# Patient Record
Sex: FEMALE | Race: WHITE | NOT HISPANIC OR LATINO | Employment: OTHER | URBAN - METROPOLITAN AREA
[De-identification: names, ages, dates, MRNs, and addresses within clinical notes are randomized per-mention and may not be internally consistent; named-entity substitution may affect disease eponyms.]

---

## 2017-01-01 ENCOUNTER — APPOINTMENT (OUTPATIENT)
Dept: LAB | Facility: HOSPITAL | Age: 82
End: 2017-01-01
Attending: INTERNAL MEDICINE
Payer: MEDICARE

## 2017-01-01 ENCOUNTER — HOSPITAL ENCOUNTER (OUTPATIENT)
Dept: INFUSION CENTER | Facility: HOSPITAL | Age: 82
Discharge: HOME | End: 2017-01-17
Payer: MEDICARE

## 2017-01-01 ENCOUNTER — TRANSCRIBE ORDERS (OUTPATIENT)
Dept: LAB | Facility: HOSPITAL | Age: 82
End: 2017-01-01

## 2017-01-01 ENCOUNTER — ALLSCRIPTS OFFICE VISIT (OUTPATIENT)
Dept: WOUND CARE | Facility: HOSPITAL | Age: 82
End: 2017-01-01
Attending: PREVENTIVE MEDICINE
Payer: MEDICARE

## 2017-01-01 ENCOUNTER — ALLSCRIPTS OFFICE VISIT (OUTPATIENT)
Dept: OTHER | Facility: OTHER | Age: 82
End: 2017-01-01

## 2017-01-01 ENCOUNTER — HOSPITAL ENCOUNTER (OUTPATIENT)
Dept: INFUSION CENTER | Facility: HOSPITAL | Age: 82
Discharge: HOME | End: 2017-03-13
Payer: MEDICARE

## 2017-01-01 ENCOUNTER — APPOINTMENT (EMERGENCY)
Dept: RADIOLOGY | Facility: HOSPITAL | Age: 82
DRG: 808 | End: 2017-01-01
Payer: MEDICARE

## 2017-01-01 ENCOUNTER — GENERIC CONVERSION - ENCOUNTER (OUTPATIENT)
Dept: OTHER | Facility: OTHER | Age: 82
End: 2017-01-01

## 2017-01-01 ENCOUNTER — HOSPITAL ENCOUNTER (OUTPATIENT)
Dept: INFUSION CENTER | Facility: HOSPITAL | Age: 82
Discharge: HOME | End: 2017-02-03
Payer: MEDICARE

## 2017-01-01 ENCOUNTER — HOSPITAL ENCOUNTER (OUTPATIENT)
Dept: INFUSION CENTER | Facility: HOSPITAL | Age: 82
Discharge: HOME | End: 2017-04-12
Payer: MEDICARE

## 2017-01-01 ENCOUNTER — HOSPITAL ENCOUNTER (OUTPATIENT)
Dept: INFUSION CENTER | Facility: HOSPITAL | Age: 82
Discharge: HOME | End: 2017-01-19
Payer: MEDICARE

## 2017-01-01 ENCOUNTER — HOSPITAL ENCOUNTER (OUTPATIENT)
Dept: INFUSION CENTER | Facility: HOSPITAL | Age: 82
Discharge: HOME | End: 2017-03-02
Payer: MEDICARE

## 2017-01-01 ENCOUNTER — HOSPITAL ENCOUNTER (OUTPATIENT)
Dept: INFUSION CENTER | Facility: HOSPITAL | Age: 82
Discharge: HOME | End: 2017-04-08
Payer: MEDICARE

## 2017-01-01 ENCOUNTER — HOSPITAL ENCOUNTER (OUTPATIENT)
Dept: INFUSION CENTER | Facility: HOSPITAL | Age: 82
Discharge: HOME | DRG: 808 | End: 2017-04-24
Payer: MEDICARE

## 2017-01-01 ENCOUNTER — HOSPITAL ENCOUNTER (OUTPATIENT)
Dept: INFUSION CENTER | Facility: HOSPITAL | Age: 82
Discharge: HOME | End: 2017-01-18
Payer: MEDICARE

## 2017-01-01 ENCOUNTER — HOSPITAL ENCOUNTER (OUTPATIENT)
Dept: INFUSION CENTER | Facility: HOSPITAL | Age: 82
Discharge: HOME | End: 2017-04-11
Payer: MEDICARE

## 2017-01-01 ENCOUNTER — HOSPITAL ENCOUNTER (OUTPATIENT)
Dept: INFUSION CENTER | Facility: HOSPITAL | Age: 82
Discharge: HOME | End: 2017-01-03
Payer: MEDICARE

## 2017-01-01 ENCOUNTER — HOSPITAL ENCOUNTER (OUTPATIENT)
Dept: INFUSION CENTER | Facility: HOSPITAL | Age: 82
Discharge: HOME | End: 2017-04-17
Payer: MEDICARE

## 2017-01-01 ENCOUNTER — HOSPITAL ENCOUNTER (OUTPATIENT)
Dept: INFUSION CENTER | Facility: HOSPITAL | Age: 82
Discharge: HOME | End: 2017-01-20
Payer: MEDICARE

## 2017-01-01 ENCOUNTER — HOSPITAL ENCOUNTER (OUTPATIENT)
Dept: INFUSION CENTER | Facility: HOSPITAL | Age: 82
End: 2017-01-01
Payer: MEDICARE

## 2017-01-01 ENCOUNTER — HOSPITAL ENCOUNTER (OUTPATIENT)
Dept: INFUSION CENTER | Facility: HOSPITAL | Age: 82
Discharge: HOME | End: 2017-01-30
Payer: MEDICARE

## 2017-01-01 ENCOUNTER — HOSPITAL ENCOUNTER (OUTPATIENT)
Dept: INFUSION CENTER | Facility: HOSPITAL | Age: 82
Discharge: HOME | End: 2017-01-23
Payer: MEDICARE

## 2017-01-01 ENCOUNTER — HOSPITAL ENCOUNTER (OUTPATIENT)
Dept: INFUSION CENTER | Facility: HOSPITAL | Age: 82
Discharge: HOME | End: 2017-04-26
Payer: MEDICARE

## 2017-01-01 ENCOUNTER — OFFICE VISIT (OUTPATIENT)
Dept: URGENT CARE | Age: 82
End: 2017-01-01
Payer: MEDICARE

## 2017-01-01 ENCOUNTER — HOSPITAL ENCOUNTER (INPATIENT)
Facility: HOSPITAL | Age: 82
LOS: 14 days | Discharge: HOSPICE | DRG: 808 | End: 2017-05-09
Attending: EMERGENCY MEDICINE | Admitting: INTERNAL MEDICINE
Payer: MEDICARE

## 2017-01-01 ENCOUNTER — HOSPITAL ENCOUNTER (OUTPATIENT)
Dept: INFUSION CENTER | Facility: HOSPITAL | Age: 82
Discharge: HOME | End: 2017-02-27
Payer: MEDICARE

## 2017-01-01 ENCOUNTER — HOSPITAL ENCOUNTER (OUTPATIENT)
Dept: INFUSION CENTER | Facility: HOSPITAL | Age: 82
Discharge: HOME | End: 2017-03-16
Payer: MEDICARE

## 2017-01-01 ENCOUNTER — HOSPITAL ENCOUNTER (OUTPATIENT)
Dept: INFUSION CENTER | Facility: HOSPITAL | Age: 82
Discharge: HOME | End: 2017-02-28
Payer: MEDICARE

## 2017-01-01 ENCOUNTER — HOSPITAL ENCOUNTER (OUTPATIENT)
Dept: INFUSION CENTER | Facility: HOSPITAL | Age: 82
Discharge: HOME | End: 2017-03-17
Payer: MEDICARE

## 2017-01-01 ENCOUNTER — HOSPITAL ENCOUNTER (OUTPATIENT)
Dept: INFUSION CENTER | Facility: HOSPITAL | Age: 82
Discharge: HOME | End: 2017-04-18
Payer: MEDICARE

## 2017-01-01 ENCOUNTER — HOSPITAL ENCOUNTER (OUTPATIENT)
Dept: INFUSION CENTER | Facility: HOSPITAL | Age: 82
Discharge: HOME | End: 2017-02-20
Payer: MEDICARE

## 2017-01-01 ENCOUNTER — HOSPITAL ENCOUNTER (OUTPATIENT)
Dept: INFUSION CENTER | Facility: HOSPITAL | Age: 82
Discharge: HOME | End: 2017-04-13
Payer: MEDICARE

## 2017-01-01 ENCOUNTER — HOSPITAL ENCOUNTER (OUTPATIENT)
Dept: INFUSION CENTER | Facility: HOSPITAL | Age: 82
Discharge: HOME | End: 2017-03-20
Payer: MEDICARE

## 2017-01-01 ENCOUNTER — HOSPITAL ENCOUNTER (OUTPATIENT)
Dept: INFUSION CENTER | Facility: HOSPITAL | Age: 82
Discharge: HOME | End: 2017-03-01
Payer: MEDICARE

## 2017-01-01 ENCOUNTER — HOSPITAL ENCOUNTER (OUTPATIENT)
Dept: INFUSION CENTER | Facility: HOSPITAL | Age: 82
Discharge: HOME | End: 2017-02-13
Payer: MEDICARE

## 2017-01-01 ENCOUNTER — HOSPITAL ENCOUNTER (OUTPATIENT)
Dept: INFUSION CENTER | Facility: HOSPITAL | Age: 82
Discharge: HOME | End: 2017-04-04
Payer: MEDICARE

## 2017-01-01 ENCOUNTER — HOSPITAL ENCOUNTER (OUTPATIENT)
Dept: INFUSION CENTER | Facility: HOSPITAL | Age: 82
Discharge: HOME | End: 2017-04-07
Payer: MEDICARE

## 2017-01-01 ENCOUNTER — HOSPITAL ENCOUNTER (OUTPATIENT)
Dept: INFUSION CENTER | Facility: HOSPITAL | Age: 82
Discharge: HOME | End: 2017-01-16
Payer: MEDICARE

## 2017-01-01 ENCOUNTER — HOSPITAL ENCOUNTER (OUTPATIENT)
Dept: INFUSION CENTER | Facility: HOSPITAL | Age: 82
Discharge: HOME | End: 2017-04-10
Payer: MEDICARE

## 2017-01-01 ENCOUNTER — HOSPITAL ENCOUNTER (OUTPATIENT)
Dept: INFUSION CENTER | Facility: HOSPITAL | Age: 82
Discharge: HOME | End: 2017-02-02
Payer: MEDICARE

## 2017-01-01 ENCOUNTER — HOSPITAL ENCOUNTER (OUTPATIENT)
Dept: INFUSION CENTER | Facility: HOSPITAL | Age: 82
Discharge: HOME | End: 2017-01-09
Payer: MEDICARE

## 2017-01-01 VITALS
TEMPERATURE: 96.3 F | DIASTOLIC BLOOD PRESSURE: 62 MMHG | SYSTOLIC BLOOD PRESSURE: 144 MMHG | OXYGEN SATURATION: 98 % | RESPIRATION RATE: 20 BRPM | HEART RATE: 70 BPM

## 2017-01-01 VITALS
DIASTOLIC BLOOD PRESSURE: 60 MMHG | HEART RATE: 68 BPM | RESPIRATION RATE: 18 BRPM | SYSTOLIC BLOOD PRESSURE: 132 MMHG | TEMPERATURE: 96.2 F

## 2017-01-01 VITALS
SYSTOLIC BLOOD PRESSURE: 135 MMHG | DIASTOLIC BLOOD PRESSURE: 63 MMHG | RESPIRATION RATE: 16 BRPM | HEART RATE: 80 BPM | TEMPERATURE: 97.7 F

## 2017-01-01 VITALS
HEIGHT: 59 IN | WEIGHT: 115.3 LBS | TEMPERATURE: 97.5 F | BODY MASS INDEX: 23.24 KG/M2 | OXYGEN SATURATION: 98 % | SYSTOLIC BLOOD PRESSURE: 137 MMHG | RESPIRATION RATE: 20 BRPM | DIASTOLIC BLOOD PRESSURE: 69 MMHG | HEART RATE: 77 BPM

## 2017-01-01 VITALS
DIASTOLIC BLOOD PRESSURE: 71 MMHG | HEART RATE: 71 BPM | TEMPERATURE: 99.1 F | SYSTOLIC BLOOD PRESSURE: 145 MMHG | RESPIRATION RATE: 20 BRPM

## 2017-01-01 VITALS
RESPIRATION RATE: 18 BRPM | TEMPERATURE: 97 F | HEIGHT: 60 IN | WEIGHT: 115.3 LBS | HEART RATE: 86 BPM | DIASTOLIC BLOOD PRESSURE: 64 MMHG | BODY MASS INDEX: 22.64 KG/M2 | SYSTOLIC BLOOD PRESSURE: 124 MMHG

## 2017-01-01 VITALS
SYSTOLIC BLOOD PRESSURE: 126 MMHG | TEMPERATURE: 97.6 F | DIASTOLIC BLOOD PRESSURE: 60 MMHG | HEART RATE: 88 BPM | RESPIRATION RATE: 20 BRPM

## 2017-01-01 VITALS
HEART RATE: 78 BPM | SYSTOLIC BLOOD PRESSURE: 122 MMHG | DIASTOLIC BLOOD PRESSURE: 60 MMHG | TEMPERATURE: 97.2 F | RESPIRATION RATE: 18 BRPM

## 2017-01-01 VITALS
DIASTOLIC BLOOD PRESSURE: 68 MMHG | WEIGHT: 114.86 LBS | SYSTOLIC BLOOD PRESSURE: 153 MMHG | HEART RATE: 86 BPM | TEMPERATURE: 97 F | RESPIRATION RATE: 16 BRPM | HEIGHT: 60 IN | BODY MASS INDEX: 22.55 KG/M2

## 2017-01-01 VITALS
TEMPERATURE: 98.3 F | HEART RATE: 84 BPM | DIASTOLIC BLOOD PRESSURE: 72 MMHG | BODY MASS INDEX: 21.6 KG/M2 | HEIGHT: 60 IN | SYSTOLIC BLOOD PRESSURE: 140 MMHG | RESPIRATION RATE: 18 BRPM | WEIGHT: 110.01 LBS

## 2017-01-01 VITALS
RESPIRATION RATE: 20 BRPM | RESPIRATION RATE: 18 BRPM | SYSTOLIC BLOOD PRESSURE: 104 MMHG | TEMPERATURE: 96.6 F | HEART RATE: 68 BPM | SYSTOLIC BLOOD PRESSURE: 130 MMHG | DIASTOLIC BLOOD PRESSURE: 70 MMHG | HEART RATE: 76 BPM | TEMPERATURE: 97.2 F | DIASTOLIC BLOOD PRESSURE: 66 MMHG

## 2017-01-01 VITALS
TEMPERATURE: 97.8 F | HEART RATE: 76 BPM | RESPIRATION RATE: 18 BRPM | DIASTOLIC BLOOD PRESSURE: 70 MMHG | SYSTOLIC BLOOD PRESSURE: 130 MMHG

## 2017-01-01 VITALS
RESPIRATION RATE: 18 BRPM | HEART RATE: 16 BPM | TEMPERATURE: 97.4 F | DIASTOLIC BLOOD PRESSURE: 76 MMHG | SYSTOLIC BLOOD PRESSURE: 142 MMHG

## 2017-01-01 VITALS
TEMPERATURE: 98.2 F | RESPIRATION RATE: 20 BRPM | HEART RATE: 80 BPM | SYSTOLIC BLOOD PRESSURE: 132 MMHG | DIASTOLIC BLOOD PRESSURE: 60 MMHG

## 2017-01-01 VITALS
HEART RATE: 70 BPM | RESPIRATION RATE: 18 BRPM | DIASTOLIC BLOOD PRESSURE: 73 MMHG | SYSTOLIC BLOOD PRESSURE: 156 MMHG | TEMPERATURE: 96 F

## 2017-01-01 VITALS — TEMPERATURE: 97.8 F

## 2017-01-01 VITALS
HEART RATE: 82 BPM | SYSTOLIC BLOOD PRESSURE: 143 MMHG | RESPIRATION RATE: 20 BRPM | DIASTOLIC BLOOD PRESSURE: 70 MMHG | TEMPERATURE: 97.6 F

## 2017-01-01 DIAGNOSIS — D61.818 PANCYTOPENIA (HCC): Primary | ICD-10-CM

## 2017-01-01 DIAGNOSIS — D64.9 ANEMIA: ICD-10-CM

## 2017-01-01 DIAGNOSIS — D61.818 ACQUIRED PANCYTOPENIA (HCC): Primary | ICD-10-CM

## 2017-01-01 DIAGNOSIS — R50.81 NEUTROPENIC FEVER (HCC): ICD-10-CM

## 2017-01-01 DIAGNOSIS — D70.9 NEUTROPENIC FEVER (HCC): ICD-10-CM

## 2017-01-01 DIAGNOSIS — E83.19 OTHER DISORDERS OF IRON METABOLISM: ICD-10-CM

## 2017-01-01 DIAGNOSIS — D61.818 OTHER PANCYTOPENIA (HCC): ICD-10-CM

## 2017-01-01 DIAGNOSIS — D46.9 MDS (MYELODYSPLASTIC SYNDROME) (HCC): ICD-10-CM

## 2017-01-01 DIAGNOSIS — A41.9 SEPSIS (HCC): ICD-10-CM

## 2017-01-01 DIAGNOSIS — S51.009A: ICD-10-CM

## 2017-01-01 DIAGNOSIS — D61.818 PANCYTOPENIA (HCC): ICD-10-CM

## 2017-01-01 LAB
ABO GROUP BLD BPU: NORMAL
ABO GROUP BLD: NORMAL
ALBUMIN SERPL BCP-MCNC: 2.4 G/DL (ref 3.5–5)
ALBUMIN SERPL BCP-MCNC: 3.2 G/DL (ref 3.5–5)
ALBUMIN SERPL BCP-MCNC: 3.6 G/DL (ref 3.5–5)
ALBUMIN SERPL BCP-MCNC: 3.7 G/DL (ref 3.5–5)
ALP SERPL-CCNC: 44 U/L (ref 46–116)
ALP SERPL-CCNC: 55 U/L (ref 46–116)
ALP SERPL-CCNC: 58 U/L (ref 46–116)
ALP SERPL-CCNC: 72 U/L (ref 46–116)
ALT SERPL W P-5'-P-CCNC: 24 U/L (ref 12–78)
ALT SERPL W P-5'-P-CCNC: 29 U/L (ref 12–78)
ALT SERPL W P-5'-P-CCNC: 29 U/L (ref 12–78)
ALT SERPL W P-5'-P-CCNC: 30 U/L (ref 12–78)
ANION GAP SERPL CALCULATED.3IONS-SCNC: 4 MMOL/L (ref 4–13)
ANION GAP SERPL CALCULATED.3IONS-SCNC: 5 MMOL/L (ref 4–13)
ANION GAP SERPL CALCULATED.3IONS-SCNC: 6 MMOL/L (ref 4–13)
ANION GAP SERPL CALCULATED.3IONS-SCNC: 7 MMOL/L (ref 4–13)
ANION GAP SERPL CALCULATED.3IONS-SCNC: 8 MMOL/L (ref 4–13)
ANISOCYTOSIS BLD QL SMEAR: PRESENT
AST SERPL W P-5'-P-CCNC: 10 U/L (ref 5–45)
AST SERPL W P-5'-P-CCNC: 18 U/L (ref 5–45)
AST SERPL W P-5'-P-CCNC: 9 U/L (ref 5–45)
AST SERPL W P-5'-P-CCNC: 9 U/L (ref 5–45)
ATRIAL RATE: 85 BPM
BACTERIA BLD CULT: NORMAL
BACTERIA BLD CULT: NORMAL
BACTERIA UR CULT: NORMAL
BACTERIA UR CULT: NORMAL
BACTERIA UR QL AUTO: ABNORMAL /HPF
BASOPHILS # BLD AUTO: 0 THOUSANDS/ΜL (ref 0–0.1)
BASOPHILS # BLD AUTO: 0.01 THOUSANDS/ΜL (ref 0–0.1)
BASOPHILS # BLD AUTO: 0.02 THOUSANDS/ΜL (ref 0–0.1)
BASOPHILS # BLD MANUAL: 0 THOUSAND/UL (ref 0–0.1)
BASOPHILS # BLD MANUAL: 0.01 THOUSAND/UL (ref 0–0.1)
BASOPHILS NFR BLD AUTO: 0 % (ref 0–1)
BASOPHILS NFR BLD AUTO: 1 % (ref 0–1)
BASOPHILS NFR BLD AUTO: 3 % (ref 0–1)
BASOPHILS NFR MAR MANUAL: 0 % (ref 0–1)
BASOPHILS NFR MAR MANUAL: 1 % (ref 0–1)
BASOPHILS NFR MAR MANUAL: 1 % (ref 0–1)
BASOPHILS NFR MAR MANUAL: 2 % (ref 0–1)
BILIRUB SERPL-MCNC: 0.68 MG/DL (ref 0.2–1)
BILIRUB SERPL-MCNC: 0.88 MG/DL (ref 0.2–1)
BILIRUB SERPL-MCNC: 1.27 MG/DL (ref 0.2–1)
BILIRUB SERPL-MCNC: 1.58 MG/DL (ref 0.2–1)
BILIRUB UR QL STRIP: NEGATIVE
BLD GP AB SCN SERPL QL: NEGATIVE
BPU ID: NORMAL
BUN SERPL-MCNC: 19 MG/DL (ref 5–25)
BUN SERPL-MCNC: 20 MG/DL (ref 5–25)
BUN SERPL-MCNC: 23 MG/DL (ref 5–25)
BUN SERPL-MCNC: 23 MG/DL (ref 5–25)
BUN SERPL-MCNC: 24 MG/DL (ref 5–25)
BUN SERPL-MCNC: 24 MG/DL (ref 5–25)
BUN SERPL-MCNC: 25 MG/DL (ref 5–25)
BUN SERPL-MCNC: 25 MG/DL (ref 5–25)
BUN SERPL-MCNC: 26 MG/DL (ref 5–25)
BUN SERPL-MCNC: 26 MG/DL (ref 5–25)
BUN SERPL-MCNC: 28 MG/DL (ref 5–25)
BUN SERPL-MCNC: 29 MG/DL (ref 5–25)
BUN SERPL-MCNC: 30 MG/DL (ref 5–25)
BUN SERPL-MCNC: 32 MG/DL (ref 5–25)
C DIFF TOX GENS STL QL NAA+PROBE: NORMAL
CALCIUM SERPL-MCNC: 7.6 MG/DL (ref 8.3–10.1)
CALCIUM SERPL-MCNC: 7.6 MG/DL (ref 8.3–10.1)
CALCIUM SERPL-MCNC: 7.9 MG/DL (ref 8.3–10.1)
CALCIUM SERPL-MCNC: 7.9 MG/DL (ref 8.3–10.1)
CALCIUM SERPL-MCNC: 8.2 MG/DL (ref 8.3–10.1)
CALCIUM SERPL-MCNC: 8.3 MG/DL (ref 8.3–10.1)
CALCIUM SERPL-MCNC: 8.4 MG/DL (ref 8.3–10.1)
CALCIUM SERPL-MCNC: 8.4 MG/DL (ref 8.3–10.1)
CALCIUM SERPL-MCNC: 8.5 MG/DL (ref 8.3–10.1)
CALCIUM SERPL-MCNC: 8.5 MG/DL (ref 8.3–10.1)
CALCIUM SERPL-MCNC: 8.6 MG/DL (ref 8.3–10.1)
CALCIUM SERPL-MCNC: 8.7 MG/DL (ref 8.3–10.1)
CALCIUM SERPL-MCNC: 8.8 MG/DL (ref 8.3–10.1)
CALCIUM SERPL-MCNC: 9 MG/DL (ref 8.3–10.1)
CHLORIDE SERPL-SCNC: 101 MMOL/L (ref 100–108)
CHLORIDE SERPL-SCNC: 102 MMOL/L (ref 100–108)
CHLORIDE SERPL-SCNC: 103 MMOL/L (ref 100–108)
CHLORIDE SERPL-SCNC: 104 MMOL/L (ref 100–108)
CHLORIDE SERPL-SCNC: 105 MMOL/L (ref 100–108)
CHLORIDE SERPL-SCNC: 95 MMOL/L (ref 100–108)
CHLORIDE SERPL-SCNC: 95 MMOL/L (ref 100–108)
CHLORIDE SERPL-SCNC: 96 MMOL/L (ref 100–108)
CHLORIDE SERPL-SCNC: 97 MMOL/L (ref 100–108)
CHLORIDE SERPL-SCNC: 97 MMOL/L (ref 100–108)
CHLORIDE SERPL-SCNC: 99 MMOL/L (ref 100–108)
CHLORIDE SERPL-SCNC: 99 MMOL/L (ref 100–108)
CLARITY UR: CLEAR
CO2 SERPL-SCNC: 28 MMOL/L (ref 21–32)
CO2 SERPL-SCNC: 29 MMOL/L (ref 21–32)
CO2 SERPL-SCNC: 29 MMOL/L (ref 21–32)
CO2 SERPL-SCNC: 30 MMOL/L (ref 21–32)
CO2 SERPL-SCNC: 31 MMOL/L (ref 21–32)
CO2 SERPL-SCNC: 33 MMOL/L (ref 21–32)
CO2 SERPL-SCNC: 33 MMOL/L (ref 21–32)
CO2 SERPL-SCNC: 34 MMOL/L (ref 21–32)
CO2 SERPL-SCNC: 35 MMOL/L (ref 21–32)
CO2 SERPL-SCNC: 36 MMOL/L (ref 21–32)
COLOR UR: YELLOW
CREAT SERPL-MCNC: 0.43 MG/DL (ref 0.6–1.3)
CREAT SERPL-MCNC: 0.44 MG/DL (ref 0.6–1.3)
CREAT SERPL-MCNC: 0.45 MG/DL (ref 0.6–1.3)
CREAT SERPL-MCNC: 0.46 MG/DL (ref 0.6–1.3)
CREAT SERPL-MCNC: 0.47 MG/DL (ref 0.6–1.3)
CREAT SERPL-MCNC: 0.5 MG/DL (ref 0.6–1.3)
CREAT SERPL-MCNC: 0.5 MG/DL (ref 0.6–1.3)
CREAT SERPL-MCNC: 0.52 MG/DL (ref 0.6–1.3)
CREAT SERPL-MCNC: 0.52 MG/DL (ref 0.6–1.3)
CREAT SERPL-MCNC: 0.53 MG/DL (ref 0.6–1.3)
CREAT SERPL-MCNC: 0.58 MG/DL (ref 0.6–1.3)
CREAT SERPL-MCNC: 0.63 MG/DL (ref 0.6–1.3)
CREAT SERPL-MCNC: 0.73 MG/DL (ref 0.6–1.3)
CREAT SERPL-MCNC: 0.87 MG/DL (ref 0.6–1.3)
CROSSMATCH: NORMAL
DACRYOCYTES BLD QL SMEAR: PRESENT
EOSINOPHIL # BLD AUTO: 0 THOUSAND/ΜL (ref 0–0.61)
EOSINOPHIL # BLD AUTO: 0.01 THOUSAND/ΜL (ref 0–0.61)
EOSINOPHIL # BLD AUTO: 0.02 THOUSAND/ΜL (ref 0–0.61)
EOSINOPHIL # BLD AUTO: 0.02 THOUSAND/ΜL (ref 0–0.61)
EOSINOPHIL # BLD MANUAL: 0 THOUSAND/UL (ref 0–0.4)
EOSINOPHIL # BLD MANUAL: 0.01 THOUSAND/UL (ref 0–0.4)
EOSINOPHIL NFR BLD AUTO: 0 % (ref 0–6)
EOSINOPHIL NFR BLD AUTO: 1 % (ref 0–6)
EOSINOPHIL NFR BLD MANUAL: 0 % (ref 0–6)
EOSINOPHIL NFR BLD MANUAL: 1 % (ref 0–6)
EOSINOPHIL NFR BLD MANUAL: 1 % (ref 0–6)
ERYTHROCYTE [DISTWIDTH] IN BLOOD BY AUTOMATED COUNT: 14.4 % (ref 11.6–15.1)
ERYTHROCYTE [DISTWIDTH] IN BLOOD BY AUTOMATED COUNT: 14.5 % (ref 11.6–15.1)
ERYTHROCYTE [DISTWIDTH] IN BLOOD BY AUTOMATED COUNT: 14.6 % (ref 11.6–15.1)
ERYTHROCYTE [DISTWIDTH] IN BLOOD BY AUTOMATED COUNT: 14.8 % (ref 11.6–15.1)
ERYTHROCYTE [DISTWIDTH] IN BLOOD BY AUTOMATED COUNT: 14.9 % (ref 11.6–15.1)
ERYTHROCYTE [DISTWIDTH] IN BLOOD BY AUTOMATED COUNT: 15.1 % (ref 11.6–15.1)
ERYTHROCYTE [DISTWIDTH] IN BLOOD BY AUTOMATED COUNT: 15.5 % (ref 11.6–15.1)
ERYTHROCYTE [DISTWIDTH] IN BLOOD BY AUTOMATED COUNT: 15.9 % (ref 11.6–15.1)
ERYTHROCYTE [DISTWIDTH] IN BLOOD BY AUTOMATED COUNT: 16 % (ref 11.6–15.1)
ERYTHROCYTE [DISTWIDTH] IN BLOOD BY AUTOMATED COUNT: 16.2 % (ref 11.6–15.1)
ERYTHROCYTE [DISTWIDTH] IN BLOOD BY AUTOMATED COUNT: 16.2 % (ref 11.6–15.1)
ERYTHROCYTE [DISTWIDTH] IN BLOOD BY AUTOMATED COUNT: 16.3 % (ref 11.6–15.1)
ERYTHROCYTE [DISTWIDTH] IN BLOOD BY AUTOMATED COUNT: 16.6 % (ref 11.6–15.1)
ERYTHROCYTE [DISTWIDTH] IN BLOOD BY AUTOMATED COUNT: 16.8 % (ref 11.6–15.1)
ERYTHROCYTE [DISTWIDTH] IN BLOOD BY AUTOMATED COUNT: 17.1 % (ref 11.6–15.1)
ERYTHROCYTE [DISTWIDTH] IN BLOOD BY AUTOMATED COUNT: 17.3 % (ref 11.6–15.1)
ERYTHROCYTE [DISTWIDTH] IN BLOOD BY AUTOMATED COUNT: 17.6 % (ref 11.6–15.1)
ERYTHROCYTE [DISTWIDTH] IN BLOOD BY AUTOMATED COUNT: 17.7 % (ref 11.6–15.1)
ERYTHROCYTE [DISTWIDTH] IN BLOOD BY AUTOMATED COUNT: 17.7 % (ref 11.6–15.1)
ERYTHROCYTE [DISTWIDTH] IN BLOOD BY AUTOMATED COUNT: 18.2 % (ref 11.6–15.1)
ERYTHROCYTE [DISTWIDTH] IN BLOOD BY AUTOMATED COUNT: 18.6 % (ref 11.6–15.1)
ERYTHROCYTE [DISTWIDTH] IN BLOOD BY AUTOMATED COUNT: 18.9 % (ref 11.6–15.1)
ERYTHROCYTE [DISTWIDTH] IN BLOOD BY AUTOMATED COUNT: 19 % (ref 11.6–15.1)
ERYTHROCYTE [DISTWIDTH] IN BLOOD BY AUTOMATED COUNT: 19 % (ref 11.6–15.1)
ERYTHROCYTE [DISTWIDTH] IN BLOOD BY AUTOMATED COUNT: 20.4 % (ref 11.6–15.1)
ERYTHROCYTE [DISTWIDTH] IN BLOOD BY AUTOMATED COUNT: 20.5 % (ref 11.6–15.1)
ERYTHROCYTE [DISTWIDTH] IN BLOOD BY AUTOMATED COUNT: 21.1 % (ref 11.6–15.1)
ERYTHROCYTE [DISTWIDTH] IN BLOOD BY AUTOMATED COUNT: 21.9 % (ref 11.6–15.1)
ERYTHROCYTE [DISTWIDTH] IN BLOOD BY AUTOMATED COUNT: 22.4 % (ref 11.6–15.1)
FERRITIN SERPL-MCNC: 1937 NG/ML (ref 8–388)
FERRITIN SERPL-MCNC: 1994 NG/ML (ref 8–388)
FERRITIN SERPL-MCNC: 2076 NG/ML (ref 8–388)
FERRITIN SERPL-MCNC: 3762 NG/ML (ref 8–388)
FLUAV AG SPEC QL: NORMAL
FLUBV AG SPEC QL: NORMAL
GFR SERPL CREATININE-BSD FRML MDRD: >60 ML/MIN/1.73SQ M
GIANT PLATELETS BLD QL SMEAR: PRESENT
GLUCOSE SERPL-MCNC: 102 MG/DL (ref 65–140)
GLUCOSE SERPL-MCNC: 105 MG/DL (ref 65–140)
GLUCOSE SERPL-MCNC: 107 MG/DL (ref 65–140)
GLUCOSE SERPL-MCNC: 109 MG/DL (ref 65–140)
GLUCOSE SERPL-MCNC: 123 MG/DL (ref 65–140)
GLUCOSE SERPL-MCNC: 127 MG/DL (ref 65–140)
GLUCOSE SERPL-MCNC: 141 MG/DL (ref 65–140)
GLUCOSE SERPL-MCNC: 142 MG/DL (ref 65–140)
GLUCOSE SERPL-MCNC: 155 MG/DL (ref 65–140)
GLUCOSE SERPL-MCNC: 157 MG/DL (ref 65–140)
GLUCOSE SERPL-MCNC: 170 MG/DL (ref 65–140)
GLUCOSE SERPL-MCNC: 194 MG/DL (ref 65–140)
GLUCOSE SERPL-MCNC: 96 MG/DL (ref 65–140)
GLUCOSE SERPL-MCNC: 99 MG/DL (ref 65–140)
GLUCOSE UR STRIP-MCNC: NEGATIVE MG/DL
HCT VFR BLD AUTO: 18.3 % (ref 34.8–46.1)
HCT VFR BLD AUTO: 19.7 % (ref 34.8–46.1)
HCT VFR BLD AUTO: 20.5 % (ref 34.8–46.1)
HCT VFR BLD AUTO: 20.9 % (ref 34.8–46.1)
HCT VFR BLD AUTO: 21.1 % (ref 34.8–46.1)
HCT VFR BLD AUTO: 21.1 % (ref 34.8–46.1)
HCT VFR BLD AUTO: 21.2 % (ref 34.8–46.1)
HCT VFR BLD AUTO: 21.7 % (ref 34.8–46.1)
HCT VFR BLD AUTO: 21.8 % (ref 34.8–46.1)
HCT VFR BLD AUTO: 22.2 % (ref 34.8–46.1)
HCT VFR BLD AUTO: 22.4 % (ref 34.8–46.1)
HCT VFR BLD AUTO: 23.1 % (ref 34.8–46.1)
HCT VFR BLD AUTO: 23.3 % (ref 34.8–46.1)
HCT VFR BLD AUTO: 23.6 % (ref 34.8–46.1)
HCT VFR BLD AUTO: 24.9 % (ref 34.8–46.1)
HCT VFR BLD AUTO: 26.7 % (ref 34.8–46.1)
HCT VFR BLD AUTO: 27.3 % (ref 34.8–46.1)
HCT VFR BLD AUTO: 27.4 % (ref 34.8–46.1)
HCT VFR BLD AUTO: 27.5 % (ref 34.8–46.1)
HCT VFR BLD AUTO: 27.5 % (ref 34.8–46.1)
HCT VFR BLD AUTO: 27.9 % (ref 34.8–46.1)
HCT VFR BLD AUTO: 28 % (ref 34.8–46.1)
HCT VFR BLD AUTO: 28.2 % (ref 34.8–46.1)
HCT VFR BLD AUTO: 28.2 % (ref 34.8–46.1)
HCT VFR BLD AUTO: 28.3 % (ref 34.8–46.1)
HCT VFR BLD AUTO: 30.4 % (ref 34.8–46.1)
HCT VFR BLD AUTO: 30.6 % (ref 34.8–46.1)
HCT VFR BLD AUTO: 31.2 % (ref 34.8–46.1)
HCT VFR BLD AUTO: 31.7 % (ref 34.8–46.1)
HCT VFR BLD AUTO: 33.7 % (ref 34.8–46.1)
HCT VFR BLD AUTO: 34 % (ref 34.8–46.1)
HGB BLD-MCNC: 10 G/DL (ref 11.5–15.4)
HGB BLD-MCNC: 10.1 G/DL (ref 11.5–15.4)
HGB BLD-MCNC: 10.4 G/DL (ref 11.5–15.4)
HGB BLD-MCNC: 10.8 G/DL (ref 11.5–15.4)
HGB BLD-MCNC: 11 G/DL (ref 11.5–15.4)
HGB BLD-MCNC: 6.2 G/DL (ref 11.5–15.4)
HGB BLD-MCNC: 6.7 G/DL (ref 11.5–15.4)
HGB BLD-MCNC: 6.8 G/DL (ref 11.5–15.4)
HGB BLD-MCNC: 7 G/DL (ref 11.5–15.4)
HGB BLD-MCNC: 7.1 G/DL (ref 11.5–15.4)
HGB BLD-MCNC: 7.3 G/DL (ref 11.5–15.4)
HGB BLD-MCNC: 7.6 G/DL (ref 11.5–15.4)
HGB BLD-MCNC: 7.7 G/DL (ref 11.5–15.4)
HGB BLD-MCNC: 8.1 G/DL (ref 11.5–15.4)
HGB BLD-MCNC: 8.2 G/DL (ref 11.5–15.4)
HGB BLD-MCNC: 8.4 G/DL (ref 11.5–15.4)
HGB BLD-MCNC: 8.7 G/DL (ref 11.5–15.4)
HGB BLD-MCNC: 9 G/DL (ref 11.5–15.4)
HGB BLD-MCNC: 9.1 G/DL (ref 11.5–15.4)
HGB BLD-MCNC: 9.1 G/DL (ref 11.5–15.4)
HGB BLD-MCNC: 9.2 G/DL (ref 11.5–15.4)
HGB BLD-MCNC: 9.3 G/DL (ref 11.5–15.4)
HGB BLD-MCNC: 9.3 G/DL (ref 11.5–15.4)
HGB BLD-MCNC: 9.5 G/DL (ref 11.5–15.4)
HGB BLD-MCNC: 9.6 G/DL (ref 11.5–15.4)
HGB BLD-MCNC: 9.6 G/DL (ref 11.5–15.4)
HGB BLD-MCNC: 9.8 G/DL (ref 11.5–15.4)
HGB UR QL STRIP.AUTO: ABNORMAL
HGB UR QL STRIP.AUTO: ABNORMAL
HYALINE CASTS #/AREA URNS LPF: ABNORMAL /LPF
HYPERCHROMIA BLD QL SMEAR: PRESENT
KETONES UR STRIP-MCNC: NEGATIVE MG/DL
LACTATE SERPL-SCNC: 2 MMOL/L (ref 0.5–2)
LEUKOCYTE ESTERASE UR QL STRIP: NEGATIVE
LIPASE SERPL-CCNC: 66 U/L (ref 73–393)
LYMPHOCYTES # BLD AUTO: 0.15 THOUSANDS/ΜL (ref 0.6–4.47)
LYMPHOCYTES # BLD AUTO: 0.16 THOUSAND/UL (ref 0.6–4.47)
LYMPHOCYTES # BLD AUTO: 0.19 THOUSANDS/ΜL (ref 0.6–4.47)
LYMPHOCYTES # BLD AUTO: 0.2 THOUSAND/UL (ref 0.6–4.47)
LYMPHOCYTES # BLD AUTO: 0.2 THOUSANDS/ΜL (ref 0.6–4.47)
LYMPHOCYTES # BLD AUTO: 0.23 THOUSAND/UL (ref 0.6–4.47)
LYMPHOCYTES # BLD AUTO: 0.26 THOUSAND/UL (ref 0.6–4.47)
LYMPHOCYTES # BLD AUTO: 0.26 THOUSAND/UL (ref 0.6–4.47)
LYMPHOCYTES # BLD AUTO: 0.27 THOUSAND/UL (ref 0.6–4.47)
LYMPHOCYTES # BLD AUTO: 0.27 THOUSAND/UL (ref 0.6–4.47)
LYMPHOCYTES # BLD AUTO: 0.3 THOUSAND/UL (ref 0.6–4.47)
LYMPHOCYTES # BLD AUTO: 0.39 THOUSANDS/ΜL (ref 0.6–4.47)
LYMPHOCYTES # BLD AUTO: 0.4 THOUSAND/UL (ref 0.6–4.47)
LYMPHOCYTES # BLD AUTO: 0.41 THOUSANDS/ΜL (ref 0.6–4.47)
LYMPHOCYTES # BLD AUTO: 0.43 THOUSANDS/ΜL (ref 0.6–4.47)
LYMPHOCYTES # BLD AUTO: 0.44 THOUSANDS/ΜL (ref 0.6–4.47)
LYMPHOCYTES # BLD AUTO: 0.45 THOUSAND/UL (ref 0.6–4.47)
LYMPHOCYTES # BLD AUTO: 0.47 THOUSANDS/ΜL (ref 0.6–4.47)
LYMPHOCYTES # BLD AUTO: 0.48 THOUSAND/UL (ref 0.6–4.47)
LYMPHOCYTES # BLD AUTO: 0.52 THOUSAND/UL (ref 0.6–4.47)
LYMPHOCYTES # BLD AUTO: 0.52 THOUSANDS/ΜL (ref 0.6–4.47)
LYMPHOCYTES # BLD AUTO: 0.66 THOUSANDS/ΜL (ref 0.6–4.47)
LYMPHOCYTES # BLD AUTO: 0.76 THOUSANDS/ΜL (ref 0.6–4.47)
LYMPHOCYTES # BLD AUTO: 0.81 THOUSAND/UL (ref 0.6–4.47)
LYMPHOCYTES # BLD AUTO: 0.82 THOUSANDS/ΜL (ref 0.6–4.47)
LYMPHOCYTES # BLD AUTO: 0.84 THOUSAND/UL (ref 0.6–4.47)
LYMPHOCYTES # BLD AUTO: 0.9 THOUSANDS/ΜL (ref 0.6–4.47)
LYMPHOCYTES # BLD AUTO: 1.35 THOUSAND/UL (ref 0.6–4.47)
LYMPHOCYTES # BLD AUTO: 19 % (ref 14–44)
LYMPHOCYTES # BLD AUTO: 35 % (ref 14–44)
LYMPHOCYTES # BLD AUTO: 35 % (ref 14–44)
LYMPHOCYTES # BLD AUTO: 37 % (ref 14–44)
LYMPHOCYTES # BLD AUTO: 40 % (ref 14–44)
LYMPHOCYTES # BLD AUTO: 46 % (ref 14–44)
LYMPHOCYTES # BLD AUTO: 55 % (ref 14–44)
LYMPHOCYTES # BLD AUTO: 62 % (ref 14–44)
LYMPHOCYTES # BLD AUTO: 62 % (ref 14–44)
LYMPHOCYTES # BLD AUTO: 71 % (ref 14–44)
LYMPHOCYTES # BLD AUTO: 73 % (ref 14–44)
LYMPHOCYTES # BLD AUTO: 81 % (ref 14–44)
LYMPHOCYTES # BLD AUTO: 82 % (ref 14–44)
LYMPHOCYTES # BLD AUTO: 84 % (ref 14–44)
LYMPHOCYTES # BLD AUTO: 9 % (ref 14–44)
LYMPHOCYTES NFR BLD AUTO: 19 % (ref 14–44)
LYMPHOCYTES NFR BLD AUTO: 21 % (ref 14–44)
LYMPHOCYTES NFR BLD AUTO: 25 % (ref 14–44)
LYMPHOCYTES NFR BLD AUTO: 27 % (ref 14–44)
LYMPHOCYTES NFR BLD AUTO: 30 % (ref 14–44)
LYMPHOCYTES NFR BLD AUTO: 30 % (ref 14–44)
LYMPHOCYTES NFR BLD AUTO: 46 % (ref 14–44)
LYMPHOCYTES NFR BLD AUTO: 49 % (ref 14–44)
LYMPHOCYTES NFR BLD AUTO: 56 % (ref 14–44)
LYMPHOCYTES NFR BLD AUTO: 57 % (ref 14–44)
LYMPHOCYTES NFR BLD AUTO: 60 % (ref 14–44)
LYMPHOCYTES NFR BLD AUTO: 64 % (ref 14–44)
LYMPHOCYTES NFR BLD AUTO: 77 % (ref 14–44)
MACROCYTES BLD QL AUTO: PRESENT
MACROCYTES BLD QL AUTO: PRESENT
MAGNESIUM SERPL-MCNC: 1.9 MG/DL (ref 1.6–2.6)
MAGNESIUM SERPL-MCNC: 2 MG/DL (ref 1.6–2.6)
MCH RBC QN AUTO: 30.5 PG (ref 26.8–34.3)
MCH RBC QN AUTO: 30.7 PG (ref 26.8–34.3)
MCH RBC QN AUTO: 30.7 PG (ref 26.8–34.3)
MCH RBC QN AUTO: 30.8 PG (ref 26.8–34.3)
MCH RBC QN AUTO: 30.9 PG (ref 26.8–34.3)
MCH RBC QN AUTO: 31.1 PG (ref 26.8–34.3)
MCH RBC QN AUTO: 31.3 PG (ref 26.8–34.3)
MCH RBC QN AUTO: 31.6 PG (ref 26.8–34.3)
MCH RBC QN AUTO: 31.7 PG (ref 26.8–34.3)
MCH RBC QN AUTO: 31.7 PG (ref 26.8–34.3)
MCH RBC QN AUTO: 31.9 PG (ref 26.8–34.3)
MCH RBC QN AUTO: 32.1 PG (ref 26.8–34.3)
MCH RBC QN AUTO: 32.1 PG (ref 26.8–34.3)
MCH RBC QN AUTO: 32.3 PG (ref 26.8–34.3)
MCH RBC QN AUTO: 32.6 PG (ref 26.8–34.3)
MCH RBC QN AUTO: 32.7 PG (ref 26.8–34.3)
MCH RBC QN AUTO: 32.9 PG (ref 26.8–34.3)
MCH RBC QN AUTO: 33.2 PG (ref 26.8–34.3)
MCH RBC QN AUTO: 33.5 PG (ref 26.8–34.3)
MCH RBC QN AUTO: 33.5 PG (ref 26.8–34.3)
MCH RBC QN AUTO: 33.9 PG (ref 26.8–34.3)
MCH RBC QN AUTO: 34 PG (ref 26.8–34.3)
MCH RBC QN AUTO: 34.3 PG (ref 26.8–34.3)
MCH RBC QN AUTO: 34.9 PG (ref 26.8–34.3)
MCH RBC QN AUTO: 35.1 PG (ref 26.8–34.3)
MCH RBC QN AUTO: 35.1 PG (ref 26.8–34.3)
MCHC RBC AUTO-ENTMCNC: 31.8 G/DL (ref 31.4–37.4)
MCHC RBC AUTO-ENTMCNC: 31.9 G/DL (ref 31.4–37.4)
MCHC RBC AUTO-ENTMCNC: 32 G/DL (ref 31.4–37.4)
MCHC RBC AUTO-ENTMCNC: 32.1 G/DL (ref 31.4–37.4)
MCHC RBC AUTO-ENTMCNC: 32.5 G/DL (ref 31.4–37.4)
MCHC RBC AUTO-ENTMCNC: 32.6 G/DL (ref 31.4–37.4)
MCHC RBC AUTO-ENTMCNC: 33 G/DL (ref 31.4–37.4)
MCHC RBC AUTO-ENTMCNC: 33 G/DL (ref 31.4–37.4)
MCHC RBC AUTO-ENTMCNC: 33.1 G/DL (ref 31.4–37.4)
MCHC RBC AUTO-ENTMCNC: 33.2 G/DL (ref 31.4–37.4)
MCHC RBC AUTO-ENTMCNC: 33.5 G/DL (ref 31.4–37.4)
MCHC RBC AUTO-ENTMCNC: 33.7 G/DL (ref 31.4–37.4)
MCHC RBC AUTO-ENTMCNC: 33.8 G/DL (ref 31.4–37.4)
MCHC RBC AUTO-ENTMCNC: 33.9 G/DL (ref 31.4–37.4)
MCHC RBC AUTO-ENTMCNC: 34 G/DL (ref 31.4–37.4)
MCHC RBC AUTO-ENTMCNC: 34 G/DL (ref 31.4–37.4)
MCHC RBC AUTO-ENTMCNC: 34.1 G/DL (ref 31.4–37.4)
MCHC RBC AUTO-ENTMCNC: 34.2 G/DL (ref 31.4–37.4)
MCHC RBC AUTO-ENTMCNC: 34.2 G/DL (ref 31.4–37.4)
MCHC RBC AUTO-ENTMCNC: 34.7 G/DL (ref 31.4–37.4)
MCHC RBC AUTO-ENTMCNC: 35 G/DL (ref 31.4–37.4)
MCHC RBC AUTO-ENTMCNC: 35.1 G/DL (ref 31.4–37.4)
MCV RBC AUTO: 100 FL (ref 82–98)
MCV RBC AUTO: 102 FL (ref 82–98)
MCV RBC AUTO: 103 FL (ref 82–98)
MCV RBC AUTO: 103 FL (ref 82–98)
MCV RBC AUTO: 104 FL (ref 82–98)
MCV RBC AUTO: 105 FL (ref 82–98)
MCV RBC AUTO: 106 FL (ref 82–98)
MCV RBC AUTO: 107 FL (ref 82–98)
MCV RBC AUTO: 88 FL (ref 82–98)
MCV RBC AUTO: 88 FL (ref 82–98)
MCV RBC AUTO: 89 FL (ref 82–98)
MCV RBC AUTO: 90 FL (ref 82–98)
MCV RBC AUTO: 90 FL (ref 82–98)
MCV RBC AUTO: 91 FL (ref 82–98)
MCV RBC AUTO: 92 FL (ref 82–98)
MCV RBC AUTO: 93 FL (ref 82–98)
MCV RBC AUTO: 95 FL (ref 82–98)
MCV RBC AUTO: 96 FL (ref 82–98)
MCV RBC AUTO: 96 FL (ref 82–98)
MCV RBC AUTO: 97 FL (ref 82–98)
MCV RBC AUTO: 97 FL (ref 82–98)
MCV RBC AUTO: 98 FL (ref 82–98)
MCV RBC AUTO: 98 FL (ref 82–98)
MCV RBC AUTO: 99 FL (ref 82–98)
METAMYELOCYTES NFR BLD MANUAL: 1 % (ref 0–1)
METAMYELOCYTES NFR BLD MANUAL: 6 % (ref 0–1)
MONOCYTES # BLD AUTO: 0 THOUSAND/UL (ref 0–1.22)
MONOCYTES # BLD AUTO: 0.01 THOUSAND/UL (ref 0–1.22)
MONOCYTES # BLD AUTO: 0.01 THOUSAND/UL (ref 0–1.22)
MONOCYTES # BLD AUTO: 0.01 THOUSAND/ΜL (ref 0.17–1.22)
MONOCYTES # BLD AUTO: 0.02 THOUSAND/UL (ref 0–1.22)
MONOCYTES # BLD AUTO: 0.03 THOUSAND/UL (ref 0–1.22)
MONOCYTES # BLD AUTO: 0.03 THOUSAND/UL (ref 0–1.22)
MONOCYTES # BLD AUTO: 0.03 THOUSAND/ΜL (ref 0.17–1.22)
MONOCYTES # BLD AUTO: 0.03 THOUSAND/ΜL (ref 0.17–1.22)
MONOCYTES # BLD AUTO: 0.04 THOUSAND/UL (ref 0–1.22)
MONOCYTES # BLD AUTO: 0.04 THOUSAND/ΜL (ref 0.17–1.22)
MONOCYTES # BLD AUTO: 0.06 THOUSAND/ΜL (ref 0.17–1.22)
MONOCYTES # BLD AUTO: 0.06 THOUSAND/ΜL (ref 0.17–1.22)
MONOCYTES # BLD AUTO: 0.07 THOUSAND/ΜL (ref 0.17–1.22)
MONOCYTES # BLD AUTO: 0.09 THOUSAND/ΜL (ref 0.17–1.22)
MONOCYTES # BLD AUTO: 0.09 THOUSAND/ΜL (ref 0.17–1.22)
MONOCYTES # BLD AUTO: 0.12 THOUSAND/UL (ref 0–1.22)
MONOCYTES # BLD AUTO: 0.13 THOUSAND/ΜL (ref 0.17–1.22)
MONOCYTES # BLD AUTO: 0.22 THOUSAND/ΜL (ref 0.17–1.22)
MONOCYTES # BLD AUTO: 0.29 THOUSAND/UL (ref 0–1.22)
MONOCYTES NFR BLD AUTO: 1 % (ref 4–12)
MONOCYTES NFR BLD AUTO: 11 % (ref 4–12)
MONOCYTES NFR BLD AUTO: 15 % (ref 4–12)
MONOCYTES NFR BLD AUTO: 3 % (ref 4–12)
MONOCYTES NFR BLD AUTO: 4 % (ref 4–12)
MONOCYTES NFR BLD AUTO: 5 % (ref 4–12)
MONOCYTES NFR BLD AUTO: 5 % (ref 4–12)
MONOCYTES NFR BLD AUTO: 7 % (ref 4–12)
MONOCYTES NFR BLD AUTO: 8 % (ref 4–12)
MONOCYTES NFR BLD AUTO: 8 % (ref 4–12)
MONOCYTES NFR BLD AUTO: 9 % (ref 4–12)
MONOCYTES NFR BLD: 0 % (ref 4–12)
MONOCYTES NFR BLD: 1 % (ref 4–12)
MONOCYTES NFR BLD: 2 % (ref 4–12)
MONOCYTES NFR BLD: 3 % (ref 4–12)
MONOCYTES NFR BLD: 4 % (ref 4–12)
MONOCYTES NFR BLD: 6 % (ref 4–12)
MYELOCYTES NFR BLD MANUAL: 1 % (ref 0–1)
MYELOCYTES NFR BLD MANUAL: 1 % (ref 0–1)
MYELOCYTES NFR BLD MANUAL: 3 % (ref 0–1)
NEUTROPHILS # BLD AUTO: 0.09 THOUSANDS/ΜL (ref 1.85–7.62)
NEUTROPHILS # BLD AUTO: 0.12 THOUSANDS/ΜL (ref 1.85–7.62)
NEUTROPHILS # BLD AUTO: 0.13 THOUSANDS/ΜL (ref 1.85–7.62)
NEUTROPHILS # BLD AUTO: 0.2 THOUSANDS/ΜL (ref 1.85–7.62)
NEUTROPHILS # BLD AUTO: 0.22 THOUSANDS/ΜL (ref 1.85–7.62)
NEUTROPHILS # BLD AUTO: 0.28 THOUSANDS/ΜL (ref 1.85–7.62)
NEUTROPHILS # BLD AUTO: 0.41 THOUSANDS/ΜL (ref 1.85–7.62)
NEUTROPHILS # BLD AUTO: 0.81 THOUSANDS/ΜL (ref 1.85–7.62)
NEUTROPHILS # BLD AUTO: 0.93 THOUSANDS/ΜL (ref 1.85–7.62)
NEUTROPHILS # BLD AUTO: 1.17 THOUSANDS/ΜL (ref 1.85–7.62)
NEUTROPHILS # BLD AUTO: 1.76 THOUSANDS/ΜL (ref 1.85–7.62)
NEUTROPHILS # BLD AUTO: 2.71 THOUSANDS/ΜL (ref 1.85–7.62)
NEUTROPHILS # BLD AUTO: 3.66 THOUSANDS/ΜL (ref 1.85–7.62)
NEUTROPHILS # BLD MANUAL: 0.04 THOUSAND/UL (ref 1.85–7.62)
NEUTROPHILS # BLD MANUAL: 0.05 THOUSAND/UL (ref 1.85–7.62)
NEUTROPHILS # BLD MANUAL: 0.06 THOUSAND/UL (ref 1.85–7.62)
NEUTROPHILS # BLD MANUAL: 0.06 THOUSAND/UL (ref 1.85–7.62)
NEUTROPHILS # BLD MANUAL: 0.08 THOUSAND/UL (ref 1.85–7.62)
NEUTROPHILS # BLD MANUAL: 0.11 THOUSAND/UL (ref 1.85–7.62)
NEUTROPHILS # BLD MANUAL: 0.16 THOUSAND/UL (ref 1.85–7.62)
NEUTROPHILS # BLD MANUAL: 0.32 THOUSAND/UL (ref 1.85–7.62)
NEUTROPHILS # BLD MANUAL: 0.37 THOUSAND/UL (ref 1.85–7.62)
NEUTROPHILS # BLD MANUAL: 0.38 THOUSAND/UL (ref 1.85–7.62)
NEUTROPHILS # BLD MANUAL: 0.64 THOUSAND/UL (ref 1.85–7.62)
NEUTROPHILS # BLD MANUAL: 0.81 THOUSAND/UL (ref 1.85–7.62)
NEUTROPHILS # BLD MANUAL: 0.93 THOUSAND/UL (ref 1.85–7.62)
NEUTROPHILS # BLD MANUAL: 4.95 THOUSAND/UL (ref 1.85–7.62)
NEUTROPHILS # BLD MANUAL: 5.2 THOUSAND/UL (ref 1.85–7.62)
NEUTS BAND NFR BLD MANUAL: 1 % (ref 0–8)
NEUTS BAND NFR BLD MANUAL: 1 % (ref 0–8)
NEUTS BAND NFR BLD MANUAL: 2 % (ref 0–8)
NEUTS BAND NFR BLD MANUAL: 3 % (ref 0–8)
NEUTS BAND NFR BLD MANUAL: 4 % (ref 0–8)
NEUTS SEG NFR BLD AUTO: 10 % (ref 43–75)
NEUTS SEG NFR BLD AUTO: 11 % (ref 43–75)
NEUTS SEG NFR BLD AUTO: 15 % (ref 43–75)
NEUTS SEG NFR BLD AUTO: 17 % (ref 43–75)
NEUTS SEG NFR BLD AUTO: 18 % (ref 43–75)
NEUTS SEG NFR BLD AUTO: 19 % (ref 43–75)
NEUTS SEG NFR BLD AUTO: 27 % (ref 43–75)
NEUTS SEG NFR BLD AUTO: 32 % (ref 43–75)
NEUTS SEG NFR BLD AUTO: 33 % (ref 43–75)
NEUTS SEG NFR BLD AUTO: 33 % (ref 43–75)
NEUTS SEG NFR BLD AUTO: 34 % (ref 43–75)
NEUTS SEG NFR BLD AUTO: 36 % (ref 43–75)
NEUTS SEG NFR BLD AUTO: 38 % (ref 43–75)
NEUTS SEG NFR BLD AUTO: 44 % (ref 43–75)
NEUTS SEG NFR BLD AUTO: 46 % (ref 43–75)
NEUTS SEG NFR BLD AUTO: 47 % (ref 43–75)
NEUTS SEG NFR BLD AUTO: 52 % (ref 43–75)
NEUTS SEG NFR BLD AUTO: 56 % (ref 43–75)
NEUTS SEG NFR BLD AUTO: 57 % (ref 43–75)
NEUTS SEG NFR BLD AUTO: 62 % (ref 43–75)
NEUTS SEG NFR BLD AUTO: 62 % (ref 43–75)
NEUTS SEG NFR BLD AUTO: 65 % (ref 43–75)
NEUTS SEG NFR BLD AUTO: 68 % (ref 43–75)
NEUTS SEG NFR BLD AUTO: 71 % (ref 43–75)
NEUTS SEG NFR BLD AUTO: 73 % (ref 43–75)
NEUTS SEG NFR BLD AUTO: 75 % (ref 43–75)
NEUTS SEG NFR BLD AUTO: 76 % (ref 43–75)
NEUTS SEG NFR BLD AUTO: 83 % (ref 43–75)
NITRITE UR QL STRIP: NEGATIVE
NON-SQ EPI CELLS URNS QL MICRO: ABNORMAL /HPF
NRBC BLD AUTO-RTO: 0 /100 WBCS
NRBC BLD AUTO-RTO: 1 /100 WBC (ref 0–2)
NRBC BLD AUTO-RTO: 1 /100 WBCS
NRBC BLD AUTO-RTO: 10 /100 WBCS
NRBC BLD AUTO-RTO: 13 /100 WBC (ref 0–2)
NRBC BLD AUTO-RTO: 14 /100 WBCS
NRBC BLD AUTO-RTO: 15 /100 WBC (ref 0–2)
NRBC BLD AUTO-RTO: 2 /100 WBC (ref 0–2)
NRBC BLD AUTO-RTO: 2 /100 WBC (ref 0–2)
NRBC BLD AUTO-RTO: 2 /100 WBCS
NRBC BLD AUTO-RTO: 3 /100 WBC (ref 0–2)
NRBC BLD AUTO-RTO: 3 /100 WBC (ref 0–2)
NRBC BLD AUTO-RTO: 3 /100 WBCS
NRBC BLD AUTO-RTO: 4 /100 WBC (ref 0–2)
NRBC BLD AUTO-RTO: 4 /100 WBCS
NRBC BLD AUTO-RTO: 4 /100 WBCS
NRBC BLD AUTO-RTO: 5 /100 WBCS
NRBC BLD AUTO-RTO: 5 /100 WBCS
NRBC BLD AUTO-RTO: 6 /100 WBC (ref 0–2)
NRBC BLD AUTO-RTO: 6 /100 WBCS
NRBC BLD AUTO-RTO: 7 /100 WBC (ref 0–2)
NRBC BLD AUTO-RTO: 7 /100 WBCS
NRBC BLD AUTO-RTO: 7 /100 WBCS
NRBC BLD AUTO-RTO: 8 /100 WBCS
OVALOCYTES BLD QL SMEAR: PRESENT
P AXIS: 67 DEGREES
PH UR STRIP.AUTO: 6.5 [PH] (ref 4.5–8)
PLATELET # BLD AUTO: 1 THOUSANDS/UL (ref 149–390)
PLATELET # BLD AUTO: 107 THOUSANDS/UL (ref 149–390)
PLATELET # BLD AUTO: 114 THOUSANDS/UL (ref 149–390)
PLATELET # BLD AUTO: 119 THOUSANDS/UL (ref 149–390)
PLATELET # BLD AUTO: 14 THOUSANDS/UL (ref 149–390)
PLATELET # BLD AUTO: 15 THOUSANDS/UL (ref 149–390)
PLATELET # BLD AUTO: 15 THOUSANDS/UL (ref 149–390)
PLATELET # BLD AUTO: 16 THOUSANDS/UL (ref 149–390)
PLATELET # BLD AUTO: 16 THOUSANDS/UL (ref 149–390)
PLATELET # BLD AUTO: 18 THOUSANDS/UL (ref 149–390)
PLATELET # BLD AUTO: 2 THOUSANDS/UL (ref 149–390)
PLATELET # BLD AUTO: 20 THOUSANDS/UL (ref 149–390)
PLATELET # BLD AUTO: 22 THOUSANDS/UL (ref 149–390)
PLATELET # BLD AUTO: 28 THOUSANDS/UL (ref 149–390)
PLATELET # BLD AUTO: 28 THOUSANDS/UL (ref 149–390)
PLATELET # BLD AUTO: 3 THOUSANDS/UL (ref 149–390)
PLATELET # BLD AUTO: 4 THOUSANDS/UL (ref 149–390)
PLATELET # BLD AUTO: 4 THOUSANDS/UL (ref 149–390)
PLATELET # BLD AUTO: 49 THOUSANDS/UL (ref 149–390)
PLATELET # BLD AUTO: 5 THOUSANDS/UL (ref 149–390)
PLATELET # BLD AUTO: 5 THOUSANDS/UL (ref 149–390)
PLATELET # BLD AUTO: 6 THOUSANDS/UL (ref 149–390)
PLATELET # BLD AUTO: 7 THOUSANDS/UL (ref 149–390)
PLATELET # BLD AUTO: 71 THOUSANDS/UL (ref 149–390)
PLATELET # BLD AUTO: 72 THOUSANDS/UL (ref 149–390)
PLATELET # BLD AUTO: 8 THOUSANDS/UL (ref 149–390)
PLATELET # BLD AUTO: 9 THOUSANDS/UL (ref 149–390)
PLATELET BLD QL SMEAR: ABNORMAL
PMV BLD AUTO: 10 FL (ref 8.9–12.7)
PMV BLD AUTO: 10 FL (ref 8.9–12.7)
PMV BLD AUTO: 10.1 FL (ref 8.9–12.7)
PMV BLD AUTO: 10.2 FL (ref 8.9–12.7)
PMV BLD AUTO: 10.2 FL (ref 8.9–12.7)
PMV BLD AUTO: 10.4 FL (ref 8.9–12.7)
PMV BLD AUTO: 10.5 FL (ref 8.9–12.7)
PMV BLD AUTO: 10.5 FL (ref 8.9–12.7)
PMV BLD AUTO: 10.8 FL (ref 8.9–12.7)
PMV BLD AUTO: 10.9 FL (ref 8.9–12.7)
PMV BLD AUTO: 10.9 FL (ref 8.9–12.7)
PMV BLD AUTO: 9 FL (ref 8.9–12.7)
PMV BLD AUTO: 9.2 FL (ref 8.9–12.7)
PMV BLD AUTO: 9.7 FL (ref 8.9–12.7)
PMV BLD AUTO: 9.7 FL (ref 8.9–12.7)
PMV BLD AUTO: 9.9 FL (ref 8.9–12.7)
POIKILOCYTOSIS BLD QL SMEAR: PRESENT
POLYCHROMASIA BLD QL SMEAR: PRESENT
POLYCHROMASIA BLD QL SMEAR: PRESENT
POTASSIUM SERPL-SCNC: 3.2 MMOL/L (ref 3.5–5.3)
POTASSIUM SERPL-SCNC: 3.4 MMOL/L (ref 3.5–5.3)
POTASSIUM SERPL-SCNC: 3.6 MMOL/L (ref 3.5–5.3)
POTASSIUM SERPL-SCNC: 3.7 MMOL/L (ref 3.5–5.3)
POTASSIUM SERPL-SCNC: 3.8 MMOL/L (ref 3.5–5.3)
POTASSIUM SERPL-SCNC: 3.9 MMOL/L (ref 3.5–5.3)
POTASSIUM SERPL-SCNC: 4.1 MMOL/L (ref 3.5–5.3)
POTASSIUM SERPL-SCNC: 4.5 MMOL/L (ref 3.5–5.3)
POTASSIUM SERPL-SCNC: 4.7 MMOL/L (ref 3.5–5.3)
POTASSIUM SERPL-SCNC: 4.7 MMOL/L (ref 3.5–5.3)
PR INTERVAL: 182 MS
PROMYELOCYTES NFR BLD MANUAL: 2 % (ref 0–0)
PROT SERPL-MCNC: 4.8 G/DL (ref 6.4–8.2)
PROT SERPL-MCNC: 5.9 G/DL (ref 6.4–8.2)
PROT SERPL-MCNC: 6 G/DL (ref 6.4–8.2)
PROT SERPL-MCNC: 6.5 G/DL (ref 6.4–8.2)
PROT UR STRIP-MCNC: NEGATIVE MG/DL
QRS AXIS: -10 DEGREES
QRSD INTERVAL: 74 MS
QT INTERVAL: 346 MS
QTC INTERVAL: 411 MS
RBC # BLD AUTO: 2.03 MILLION/UL (ref 3.81–5.12)
RBC # BLD AUTO: 2.08 MILLION/UL (ref 3.81–5.12)
RBC # BLD AUTO: 2.12 MILLION/UL (ref 3.81–5.12)
RBC # BLD AUTO: 2.12 MILLION/UL (ref 3.81–5.12)
RBC # BLD AUTO: 2.18 MILLION/UL (ref 3.81–5.12)
RBC # BLD AUTO: 2.18 MILLION/UL (ref 3.81–5.12)
RBC # BLD AUTO: 2.2 MILLION/UL (ref 3.81–5.12)
RBC # BLD AUTO: 2.25 MILLION/UL (ref 3.81–5.12)
RBC # BLD AUTO: 2.39 MILLION/UL (ref 3.81–5.12)
RBC # BLD AUTO: 2.43 MILLION/UL (ref 3.81–5.12)
RBC # BLD AUTO: 2.43 MILLION/UL (ref 3.81–5.12)
RBC # BLD AUTO: 2.46 MILLION/UL (ref 3.81–5.12)
RBC # BLD AUTO: 2.47 MILLION/UL (ref 3.81–5.12)
RBC # BLD AUTO: 2.56 MILLION/UL (ref 3.81–5.12)
RBC # BLD AUTO: 2.64 MILLION/UL (ref 3.81–5.12)
RBC # BLD AUTO: 2.65 MILLION/UL (ref 3.81–5.12)
RBC # BLD AUTO: 2.66 MILLION/UL (ref 3.81–5.12)
RBC # BLD AUTO: 2.72 MILLION/UL (ref 3.81–5.12)
RBC # BLD AUTO: 2.8 MILLION/UL (ref 3.81–5.12)
RBC # BLD AUTO: 2.88 MILLION/UL (ref 3.81–5.12)
RBC # BLD AUTO: 2.93 MILLION/UL (ref 3.81–5.12)
RBC # BLD AUTO: 2.94 MILLION/UL (ref 3.81–5.12)
RBC # BLD AUTO: 2.98 MILLION/UL (ref 3.81–5.12)
RBC # BLD AUTO: 3.01 MILLION/UL (ref 3.81–5.12)
RBC # BLD AUTO: 3.07 MILLION/UL (ref 3.81–5.12)
RBC # BLD AUTO: 3.09 MILLION/UL (ref 3.81–5.12)
RBC # BLD AUTO: 3.11 MILLION/UL (ref 3.81–5.12)
RBC # BLD AUTO: 3.12 MILLION/UL (ref 3.81–5.12)
RBC # BLD AUTO: 3.15 MILLION/UL (ref 3.81–5.12)
RBC # BLD AUTO: 3.16 MILLION/UL (ref 3.81–5.12)
RBC # BLD AUTO: 3.3 MILLION/UL (ref 3.81–5.12)
RBC #/AREA URNS AUTO: ABNORMAL /HPF
RBC MORPH BLD: NORMAL
RBC MORPH BLD: NORMAL
RBC MORPH BLD: PRESENT
RBC, URINE: ABNORMAL
RH BLD: NEGATIVE
RSV B RNA SPEC QL NAA+PROBE: NORMAL
SODIUM SERPL-SCNC: 133 MMOL/L (ref 136–145)
SODIUM SERPL-SCNC: 134 MMOL/L (ref 136–145)
SODIUM SERPL-SCNC: 135 MMOL/L (ref 136–145)
SODIUM SERPL-SCNC: 136 MMOL/L (ref 136–145)
SODIUM SERPL-SCNC: 136 MMOL/L (ref 136–145)
SODIUM SERPL-SCNC: 137 MMOL/L (ref 136–145)
SODIUM SERPL-SCNC: 139 MMOL/L (ref 136–145)
SODIUM SERPL-SCNC: 139 MMOL/L (ref 136–145)
SODIUM SERPL-SCNC: 140 MMOL/L (ref 136–145)
SP GR UR STRIP.AUTO: 1.03 (ref 1–1.03)
SPECIMEN EXPIRATION DATE: NORMAL
T WAVE AXIS: 62 DEGREES
TOTAL CELLS COUNTED SPEC: 31
TOXIC GRANULES BLD QL SMEAR: PRESENT
TOXIC GRANULES BLD QL SMEAR: PRESENT
TRANSFUSION STATUS PATIENT QL: NORMAL
UNIT DISPENSE STATUS: NORMAL
UNIT PRODUCT CODE: NORMAL
UNIT RH: NORMAL
UROBILINOGEN UR QL STRIP.AUTO: 0.2 E.U./DL
VANCOMYCIN TROUGH SERPL-MCNC: 5.1 UG/ML (ref 10–20)
VARIANT LYMPHS # BLD AUTO: 1 %
VARIANT LYMPHS # BLD AUTO: 16 %
VARIANT LYMPHS # BLD AUTO: 3 %
VARIANT LYMPHS # BLD AUTO: 5 %
VENTRICULAR RATE: 85 BPM
WBC # BLD AUTO: 0.25 THOUSAND/UL (ref 4.31–10.16)
WBC # BLD AUTO: 0.31 THOUSAND/UL (ref 4.31–10.16)
WBC # BLD AUTO: 0.32 THOUSAND/UL (ref 4.31–10.16)
WBC # BLD AUTO: 0.36 THOUSAND/UL (ref 4.31–10.16)
WBC # BLD AUTO: 0.36 THOUSAND/UL (ref 4.31–10.16)
WBC # BLD AUTO: 0.37 THOUSAND/UL (ref 4.31–10.16)
WBC # BLD AUTO: 0.4 THOUSAND/UL (ref 4.31–10.16)
WBC # BLD AUTO: 0.43 THOUSAND/UL (ref 4.31–10.16)
WBC # BLD AUTO: 0.44 THOUSAND/UL (ref 4.31–10.16)
WBC # BLD AUTO: 0.52 THOUSAND/UL (ref 4.31–10.16)
WBC # BLD AUTO: 0.57 THOUSAND/UL (ref 4.31–10.16)
WBC # BLD AUTO: 0.72 THOUSAND/UL (ref 4.31–10.16)
WBC # BLD AUTO: 0.73 THOUSAND/UL (ref 4.31–10.16)
WBC # BLD AUTO: 0.82 THOUSAND/UL (ref 4.31–10.16)
WBC # BLD AUTO: 0.83 THOUSAND/UL (ref 4.31–10.16)
WBC # BLD AUTO: 0.89 THOUSAND/UL (ref 4.31–10.16)
WBC # BLD AUTO: 0.97 THOUSAND/UL (ref 4.31–10.16)
WBC # BLD AUTO: 0.97 THOUSAND/UL (ref 4.31–10.16)
WBC # BLD AUTO: 1.06 THOUSAND/UL (ref 4.31–10.16)
WBC # BLD AUTO: 1.13 THOUSAND/UL (ref 4.31–10.16)
WBC # BLD AUTO: 1.31 THOUSAND/UL (ref 4.31–10.16)
WBC # BLD AUTO: 1.34 THOUSAND/UL (ref 4.31–10.16)
WBC # BLD AUTO: 1.37 THOUSAND/UL (ref 4.31–10.16)
WBC # BLD AUTO: 1.46 THOUSAND/UL (ref 4.31–10.16)
WBC # BLD AUTO: 1.72 THOUSAND/UL (ref 4.31–10.16)
WBC # BLD AUTO: 1.82 THOUSAND/UL (ref 4.31–10.16)
WBC # BLD AUTO: 2.48 THOUSAND/UL (ref 4.31–10.16)
WBC # BLD AUTO: 3.63 THOUSAND/UL (ref 4.31–10.16)
WBC # BLD AUTO: 4.84 THOUSAND/UL (ref 4.31–10.16)
WBC # BLD AUTO: 5.75 THOUSAND/UL (ref 4.31–10.16)
WBC # BLD AUTO: 7.13 THOUSAND/UL (ref 4.31–10.16)
WBC #/AREA URNS AUTO: ABNORMAL /HPF

## 2017-01-01 PROCEDURE — 99213 OFFICE O/P EST LOW 20 MIN: CPT | Performed by: PREVENTIVE MEDICINE

## 2017-01-01 PROCEDURE — 85007 BL SMEAR W/DIFF WBC COUNT: CPT | Performed by: INTERNAL MEDICINE

## 2017-01-01 PROCEDURE — 96367 TX/PROPH/DG ADDL SEQ IV INF: CPT

## 2017-01-01 PROCEDURE — P9038 RBC IRRADIATED: HCPCS

## 2017-01-01 PROCEDURE — 86921 COMPATIBILITY TEST INCUBATE: CPT

## 2017-01-01 PROCEDURE — 96413 CHEMO IV INFUSION 1 HR: CPT

## 2017-01-01 PROCEDURE — P9040 RBC LEUKOREDUCED IRRADIATED: HCPCS

## 2017-01-01 PROCEDURE — 85025 COMPLETE CBC W/AUTO DIFF WBC: CPT | Performed by: INTERNAL MEDICINE

## 2017-01-01 PROCEDURE — 86900 BLOOD TYPING SEROLOGIC ABO: CPT | Performed by: INTERNAL MEDICINE

## 2017-01-01 PROCEDURE — 36430 TRANSFUSION BLD/BLD COMPNT: CPT

## 2017-01-01 PROCEDURE — 85027 COMPLETE CBC AUTOMATED: CPT | Performed by: PHYSICIAN ASSISTANT

## 2017-01-01 PROCEDURE — 94760 N-INVAS EAR/PLS OXIMETRY 1: CPT

## 2017-01-01 PROCEDURE — 81001 URINALYSIS AUTO W/SCOPE: CPT | Performed by: EMERGENCY MEDICINE

## 2017-01-01 PROCEDURE — 74177 CT ABD & PELVIS W/CONTRAST: CPT

## 2017-01-01 PROCEDURE — 86922 COMPATIBILITY TEST ANTIGLOB: CPT

## 2017-01-01 PROCEDURE — P9037 PLATE PHERES LEUKOREDU IRRAD: HCPCS

## 2017-01-01 PROCEDURE — 97535 SELF CARE MNGMENT TRAINING: CPT

## 2017-01-01 PROCEDURE — G0463 HOSPITAL OUTPT CLINIC VISIT: HCPCS | Performed by: FAMILY MEDICINE

## 2017-01-01 PROCEDURE — 85027 COMPLETE CBC AUTOMATED: CPT | Performed by: NURSE PRACTITIONER

## 2017-01-01 PROCEDURE — 86850 RBC ANTIBODY SCREEN: CPT | Performed by: INTERNAL MEDICINE

## 2017-01-01 PROCEDURE — 97163 PT EVAL HIGH COMPLEX 45 MIN: CPT

## 2017-01-01 PROCEDURE — 86902 BLOOD TYPE ANTIGEN DONOR EA: CPT

## 2017-01-01 PROCEDURE — 80048 BASIC METABOLIC PNL TOTAL CA: CPT | Performed by: NURSE PRACTITIONER

## 2017-01-01 PROCEDURE — 86901 BLOOD TYPING SEROLOGIC RH(D): CPT | Performed by: INTERNAL MEDICINE

## 2017-01-01 PROCEDURE — 80048 BASIC METABOLIC PNL TOTAL CA: CPT | Performed by: INTERNAL MEDICINE

## 2017-01-01 PROCEDURE — 99212 OFFICE O/P EST SF 10 MIN: CPT | Performed by: PREVENTIVE MEDICINE

## 2017-01-01 PROCEDURE — 80048 BASIC METABOLIC PNL TOTAL CA: CPT | Performed by: PHYSICIAN ASSISTANT

## 2017-01-01 PROCEDURE — 97530 THERAPEUTIC ACTIVITIES: CPT

## 2017-01-01 PROCEDURE — 97110 THERAPEUTIC EXERCISES: CPT

## 2017-01-01 PROCEDURE — 80053 COMPREHEN METABOLIC PANEL: CPT | Performed by: INTERNAL MEDICINE

## 2017-01-01 PROCEDURE — 81003 URINALYSIS AUTO W/O SCOPE: CPT | Performed by: INTERNAL MEDICINE

## 2017-01-01 PROCEDURE — 87798 DETECT AGENT NOS DNA AMP: CPT | Performed by: PHYSICIAN ASSISTANT

## 2017-01-01 PROCEDURE — 87493 C DIFF AMPLIFIED PROBE: CPT | Performed by: INTERNAL MEDICINE

## 2017-01-01 PROCEDURE — 83690 ASSAY OF LIPASE: CPT | Performed by: EMERGENCY MEDICINE

## 2017-01-01 PROCEDURE — 85027 COMPLETE CBC AUTOMATED: CPT | Performed by: INTERNAL MEDICINE

## 2017-01-01 PROCEDURE — 85027 COMPLETE CBC AUTOMATED: CPT

## 2017-01-01 PROCEDURE — G8987 SELF CARE CURRENT STATUS: HCPCS

## 2017-01-01 PROCEDURE — 96365 THER/PROPH/DIAG IV INF INIT: CPT

## 2017-01-01 PROCEDURE — 97166 OT EVAL MOD COMPLEX 45 MIN: CPT

## 2017-01-01 PROCEDURE — 80053 COMPREHEN METABOLIC PANEL: CPT | Performed by: PHYSICIAN ASSISTANT

## 2017-01-01 PROCEDURE — 96375 TX/PRO/DX INJ NEW DRUG ADDON: CPT

## 2017-01-01 PROCEDURE — 87077 CULTURE AEROBIC IDENTIFY: CPT | Performed by: EMERGENCY MEDICINE

## 2017-01-01 PROCEDURE — 87086 URINE CULTURE/COLONY COUNT: CPT | Performed by: EMERGENCY MEDICINE

## 2017-01-01 PROCEDURE — G8979 MOBILITY GOAL STATUS: HCPCS

## 2017-01-01 PROCEDURE — 85007 BL SMEAR W/DIFF WBC COUNT: CPT | Performed by: PHYSICIAN ASSISTANT

## 2017-01-01 PROCEDURE — 83605 ASSAY OF LACTIC ACID: CPT | Performed by: EMERGENCY MEDICINE

## 2017-01-01 PROCEDURE — 30233R1 TRANSFUSION OF NONAUTOLOGOUS PLATELETS INTO PERIPHERAL VEIN, PERCUTANEOUS APPROACH: ICD-10-PCS | Performed by: INTERNAL MEDICINE

## 2017-01-01 PROCEDURE — G8978 MOBILITY CURRENT STATUS: HCPCS

## 2017-01-01 PROCEDURE — 87147 CULTURE TYPE IMMUNOLOGIC: CPT | Performed by: EMERGENCY MEDICINE

## 2017-01-01 PROCEDURE — 82728 ASSAY OF FERRITIN: CPT | Performed by: INTERNAL MEDICINE

## 2017-01-01 PROCEDURE — 93005 ELECTROCARDIOGRAM TRACING: CPT

## 2017-01-01 PROCEDURE — 97597 DBRDMT OPN WND 1ST 20 CM/<: CPT | Performed by: PREVENTIVE MEDICINE

## 2017-01-01 PROCEDURE — 85007 BL SMEAR W/DIFF WBC COUNT: CPT

## 2017-01-01 PROCEDURE — 97116 GAIT TRAINING THERAPY: CPT

## 2017-01-01 PROCEDURE — 30233N1 TRANSFUSION OF NONAUTOLOGOUS RED BLOOD CELLS INTO PERIPHERAL VEIN, PERCUTANEOUS APPROACH: ICD-10-PCS | Performed by: INTERNAL MEDICINE

## 2017-01-01 PROCEDURE — 85007 BL SMEAR W/DIFF WBC COUNT: CPT | Performed by: NURSE PRACTITIONER

## 2017-01-01 PROCEDURE — 80202 ASSAY OF VANCOMYCIN: CPT | Performed by: PHYSICIAN ASSISTANT

## 2017-01-01 PROCEDURE — 85007 BL SMEAR W/DIFF WBC COUNT: CPT | Performed by: EMERGENCY MEDICINE

## 2017-01-01 PROCEDURE — 85027 COMPLETE CBC AUTOMATED: CPT | Performed by: EMERGENCY MEDICINE

## 2017-01-01 PROCEDURE — 80053 COMPREHEN METABOLIC PANEL: CPT | Performed by: EMERGENCY MEDICINE

## 2017-01-01 PROCEDURE — 99285 EMERGENCY DEPT VISIT HI MDM: CPT

## 2017-01-01 PROCEDURE — 99203 OFFICE O/P NEW LOW 30 MIN: CPT | Performed by: FAMILY MEDICINE

## 2017-01-01 PROCEDURE — 86880 COOMBS TEST DIRECT: CPT | Performed by: INTERNAL MEDICINE

## 2017-01-01 PROCEDURE — 71020 HB CHEST X-RAY 2VW FRONTAL&LATL: CPT

## 2017-01-01 PROCEDURE — 81015 MICROSCOPIC EXAM OF URINE: CPT | Performed by: INTERNAL MEDICINE

## 2017-01-01 PROCEDURE — 87186 SC STD MICRODIL/AGAR DIL: CPT | Performed by: EMERGENCY MEDICINE

## 2017-01-01 PROCEDURE — 36415 COLL VENOUS BLD VENIPUNCTURE: CPT | Performed by: EMERGENCY MEDICINE

## 2017-01-01 PROCEDURE — 87040 BLOOD CULTURE FOR BACTERIA: CPT | Performed by: EMERGENCY MEDICINE

## 2017-01-01 PROCEDURE — 83735 ASSAY OF MAGNESIUM: CPT | Performed by: INTERNAL MEDICINE

## 2017-01-01 PROCEDURE — G8988 SELF CARE GOAL STATUS: HCPCS

## 2017-01-01 RX ORDER — SODIUM CHLORIDE 9 MG/ML
20 INJECTION, SOLUTION INTRAVENOUS ONCE
Status: COMPLETED | OUTPATIENT
Start: 2017-01-01 | End: 2017-01-01

## 2017-01-01 RX ORDER — POTASSIUM CHLORIDE 20 MEQ/1
40 TABLET, EXTENDED RELEASE ORAL ONCE
Status: COMPLETED | OUTPATIENT
Start: 2017-01-01 | End: 2017-01-01

## 2017-01-01 RX ORDER — GUAIFENESIN 600 MG
600 TABLET, EXTENDED RELEASE 12 HR ORAL EVERY 12 HOURS SCHEDULED
Qty: 60 TABLET | Refills: 0 | Status: SHIPPED | OUTPATIENT
Start: 2017-01-01 | End: 2017-01-01

## 2017-01-01 RX ORDER — ACETAMINOPHEN 325 MG/1
650 TABLET ORAL ONCE
Status: DISCONTINUED | OUTPATIENT
Start: 2017-01-01 | End: 2017-01-01 | Stop reason: HOSPADM

## 2017-01-01 RX ORDER — HYDROCODONE BITARTRATE AND ACETAMINOPHEN 5; 325 MG/1; MG/1
1 TABLET ORAL EVERY 4 HOURS
Status: DISCONTINUED | OUTPATIENT
Start: 2017-01-01 | End: 2017-01-01

## 2017-01-01 RX ORDER — SODIUM CHLORIDE 9 MG/ML
20 INJECTION, SOLUTION INTRAVENOUS ONCE
Status: DISCONTINUED | OUTPATIENT
Start: 2017-01-01 | End: 2017-01-01 | Stop reason: HOSPADM

## 2017-01-01 RX ORDER — METRONIDAZOLE 500 MG/1
500 TABLET ORAL EVERY 8 HOURS SCHEDULED
Status: DISCONTINUED | OUTPATIENT
Start: 2017-01-01 | End: 2017-01-01 | Stop reason: HOSPADM

## 2017-01-01 RX ORDER — DIPHENHYDRAMINE HYDROCHLORIDE 50 MG/ML
25 INJECTION INTRAMUSCULAR; INTRAVENOUS ONCE
Status: COMPLETED | OUTPATIENT
Start: 2017-01-01 | End: 2017-01-01

## 2017-01-01 RX ORDER — MAGNESIUM HYDROXIDE/ALUMINUM HYDROXICE/SIMETHICONE 120; 1200; 1200 MG/30ML; MG/30ML; MG/30ML
30 SUSPENSION ORAL EVERY 6 HOURS PRN
Status: DISCONTINUED | OUTPATIENT
Start: 2017-01-01 | End: 2017-01-01 | Stop reason: HOSPADM

## 2017-01-01 RX ORDER — FLUCONAZOLE 200 MG/1
200 TABLET ORAL EVERY 24 HOURS
Qty: 10 TABLET | Refills: 0 | Status: SHIPPED | OUTPATIENT
Start: 2017-01-01 | End: 2017-01-01

## 2017-01-01 RX ORDER — SODIUM CHLORIDE 9 MG/ML
20 INJECTION, SOLUTION INTRAVENOUS CONTINUOUS
Status: DISCONTINUED | OUTPATIENT
Start: 2017-01-01 | End: 2017-01-01 | Stop reason: HOSPADM

## 2017-01-01 RX ORDER — LEVALBUTEROL INHALATION SOLUTION 0.63 MG/3ML
0.63 SOLUTION RESPIRATORY (INHALATION) EVERY 8 HOURS PRN
Status: DISCONTINUED | OUTPATIENT
Start: 2017-01-01 | End: 2017-01-01

## 2017-01-01 RX ORDER — LIDOCAINE 50 MG/G
1 PATCH TOPICAL DAILY
Qty: 30 PATCH | Refills: 0 | Status: SHIPPED | OUTPATIENT
Start: 2017-01-01 | End: 2017-01-01

## 2017-01-01 RX ORDER — ALBUTEROL SULFATE 2.5 MG/3ML
2.5 SOLUTION RESPIRATORY (INHALATION) EVERY 4 HOURS PRN
Status: DISCONTINUED | OUTPATIENT
Start: 2017-01-01 | End: 2017-01-01

## 2017-01-01 RX ORDER — GUAIFENESIN 600 MG
600 TABLET, EXTENDED RELEASE 12 HR ORAL EVERY 12 HOURS SCHEDULED
Status: DISCONTINUED | OUTPATIENT
Start: 2017-01-01 | End: 2017-01-01 | Stop reason: HOSPADM

## 2017-01-01 RX ORDER — DORZOLAMIDE HYDROCHLORIDE AND TIMOLOL MALEATE 20; 5 MG/ML; MG/ML
1 SOLUTION/ DROPS OPHTHALMIC 2 TIMES DAILY
Qty: 3 ML | Refills: 0 | Status: SHIPPED | OUTPATIENT
Start: 2017-01-01 | End: 2017-01-01

## 2017-01-01 RX ORDER — ONDANSETRON 2 MG/ML
4 INJECTION INTRAMUSCULAR; INTRAVENOUS EVERY 6 HOURS PRN
Status: DISCONTINUED | OUTPATIENT
Start: 2017-01-01 | End: 2017-01-01

## 2017-01-01 RX ORDER — PREDNISONE 10 MG/1
10 TABLET ORAL 2 TIMES DAILY
Status: DISCONTINUED | OUTPATIENT
Start: 2017-01-01 | End: 2017-01-01 | Stop reason: HOSPADM

## 2017-01-01 RX ORDER — DORZOLAMIDE HYDROCHLORIDE AND TIMOLOL MALEATE 20; 5 MG/ML; MG/ML
1 SOLUTION/ DROPS OPHTHALMIC 2 TIMES DAILY
Status: DISCONTINUED | OUTPATIENT
Start: 2017-01-01 | End: 2017-01-01 | Stop reason: HOSPADM

## 2017-01-01 RX ORDER — FLUCONAZOLE 200 MG/1
200 TABLET ORAL EVERY 24 HOURS
Status: DISCONTINUED | OUTPATIENT
Start: 2017-01-01 | End: 2017-01-01 | Stop reason: HOSPADM

## 2017-01-01 RX ORDER — SODIUM CHLORIDE 9 MG/ML
100 INJECTION, SOLUTION INTRAVENOUS CONTINUOUS
Status: DISCONTINUED | OUTPATIENT
Start: 2017-01-01 | End: 2017-01-01

## 2017-01-01 RX ORDER — ACETAMINOPHEN 325 MG/1
650 TABLET ORAL ONCE
Status: COMPLETED | OUTPATIENT
Start: 2017-01-01 | End: 2017-01-01

## 2017-01-01 RX ORDER — GUAIFENESIN/DEXTROMETHORPHAN 100-10MG/5
10 SYRUP ORAL EVERY 4 HOURS PRN
Qty: 118 ML | Refills: 0 | Status: SHIPPED | OUTPATIENT
Start: 2017-01-01 | End: 2017-01-01

## 2017-01-01 RX ORDER — DIPHENHYDRAMINE HCL 25 MG
25 TABLET ORAL ONCE
Status: COMPLETED | OUTPATIENT
Start: 2017-01-01 | End: 2017-01-01

## 2017-01-01 RX ORDER — HYDROCODONE BITARTRATE AND ACETAMINOPHEN 5; 325 MG/1; MG/1
1 TABLET ORAL
Qty: 60 TABLET | Refills: 0 | Status: SHIPPED | OUTPATIENT
Start: 2017-01-01 | End: 2017-01-01

## 2017-01-01 RX ORDER — SODIUM CHLORIDE 9 MG/ML
20 INJECTION, SOLUTION INTRAVENOUS ONCE
Status: DISCONTINUED | OUTPATIENT
Start: 2017-01-01 | End: 2017-01-01

## 2017-01-01 RX ORDER — ACETAMINOPHEN 325 MG/1
650 TABLET ORAL EVERY 6 HOURS PRN
Qty: 30 TABLET | Refills: 0 | Status: SHIPPED | OUTPATIENT
Start: 2017-01-01

## 2017-01-01 RX ORDER — GUAIFENESIN/DEXTROMETHORPHAN 100-10MG/5
10 SYRUP ORAL EVERY 4 HOURS PRN
Status: DISCONTINUED | OUTPATIENT
Start: 2017-01-01 | End: 2017-01-01 | Stop reason: HOSPADM

## 2017-01-01 RX ORDER — ACETAMINOPHEN 325 MG/1
650 TABLET ORAL EVERY 6 HOURS PRN
Status: DISCONTINUED | OUTPATIENT
Start: 2017-01-01 | End: 2017-01-01 | Stop reason: HOSPADM

## 2017-01-01 RX ORDER — MECLIZINE HCL 12.5 MG/1
12.5 TABLET ORAL EVERY 8 HOURS PRN
Qty: 30 TABLET | Refills: 0 | Status: SHIPPED | OUTPATIENT
Start: 2017-01-01 | End: 2017-01-01

## 2017-01-01 RX ORDER — AMLODIPINE BESYLATE 2.5 MG/1
2.5 TABLET ORAL DAILY
Qty: 30 TABLET | Refills: 0 | Status: SHIPPED | OUTPATIENT
Start: 2017-01-01 | End: 2017-01-01

## 2017-01-01 RX ORDER — FUROSEMIDE 10 MG/ML
20 INJECTION INTRAMUSCULAR; INTRAVENOUS ONCE
Status: COMPLETED | OUTPATIENT
Start: 2017-01-01 | End: 2017-01-01

## 2017-01-01 RX ORDER — DIPHENHYDRAMINE HYDROCHLORIDE 50 MG/ML
12.5 INJECTION INTRAMUSCULAR; INTRAVENOUS ONCE
Status: COMPLETED | OUTPATIENT
Start: 2017-01-01 | End: 2017-01-01

## 2017-01-01 RX ORDER — ALBUTEROL SULFATE 90 UG/1
2 AEROSOL, METERED RESPIRATORY (INHALATION) EVERY 4 HOURS PRN
Qty: 6.7 G | Refills: 0 | Status: SHIPPED | OUTPATIENT
Start: 2017-01-01 | End: 2017-01-01

## 2017-01-01 RX ORDER — ALBUTEROL SULFATE 90 UG/1
2 AEROSOL, METERED RESPIRATORY (INHALATION) EVERY 4 HOURS PRN
Status: DISCONTINUED | OUTPATIENT
Start: 2017-01-01 | End: 2017-01-01 | Stop reason: HOSPADM

## 2017-01-01 RX ORDER — BRIMONIDINE TARTRATE, TIMOLOL MALEATE 2; 5 MG/ML; MG/ML
1 SOLUTION/ DROPS OPHTHALMIC 2 TIMES DAILY
COMMUNITY
End: 2017-01-01 | Stop reason: HOSPADM

## 2017-01-01 RX ORDER — DIPHENHYDRAMINE HYDROCHLORIDE 50 MG/ML
25 INJECTION INTRAMUSCULAR; INTRAVENOUS ONCE
Status: DISCONTINUED | OUTPATIENT
Start: 2017-01-01 | End: 2017-01-01 | Stop reason: HOSPADM

## 2017-01-01 RX ORDER — METHADONE HYDROCHLORIDE 5 MG/1
5 TABLET ORAL 3 TIMES DAILY
COMMUNITY
End: 2017-01-01

## 2017-01-01 RX ORDER — AMLODIPINE BESYLATE 2.5 MG/1
2.5 TABLET ORAL DAILY
Status: DISCONTINUED | OUTPATIENT
Start: 2017-01-01 | End: 2017-01-01 | Stop reason: HOSPADM

## 2017-01-01 RX ORDER — ONDANSETRON 2 MG/ML
4 INJECTION INTRAMUSCULAR; INTRAVENOUS EVERY 6 HOURS PRN
Status: DISCONTINUED | OUTPATIENT
Start: 2017-01-01 | End: 2017-01-01 | Stop reason: HOSPADM

## 2017-01-01 RX ORDER — HYDROCODONE BITARTRATE AND ACETAMINOPHEN 5; 325 MG/1; MG/1
1 TABLET ORAL
Status: DISCONTINUED | OUTPATIENT
Start: 2017-01-01 | End: 2017-01-01 | Stop reason: HOSPADM

## 2017-01-01 RX ORDER — PREDNISONE 10 MG/1
10 TABLET ORAL 2 TIMES DAILY
Qty: 30 TABLET | Refills: 0 | Status: SHIPPED | OUTPATIENT
Start: 2017-01-01 | End: 2017-01-01

## 2017-01-01 RX ORDER — FUROSEMIDE 20 MG/1
20 TABLET ORAL ONCE
Status: COMPLETED | OUTPATIENT
Start: 2017-01-01 | End: 2017-01-01

## 2017-01-01 RX ORDER — MECLIZINE HCL 12.5 MG/1
12.5 TABLET ORAL EVERY 8 HOURS PRN
Status: DISCONTINUED | OUTPATIENT
Start: 2017-01-01 | End: 2017-01-01 | Stop reason: HOSPADM

## 2017-01-01 RX ORDER — METOCLOPRAMIDE HYDROCHLORIDE 5 MG/ML
5 INJECTION INTRAMUSCULAR; INTRAVENOUS EVERY 6 HOURS PRN
Status: DISCONTINUED | OUTPATIENT
Start: 2017-01-01 | End: 2017-01-01 | Stop reason: HOSPADM

## 2017-01-01 RX ORDER — GINSENG 100 MG
1 CAPSULE ORAL 2 TIMES DAILY
Status: DISCONTINUED | OUTPATIENT
Start: 2017-01-01 | End: 2017-01-01 | Stop reason: HOSPADM

## 2017-01-01 RX ORDER — LIDOCAINE 50 MG/G
1 PATCH TOPICAL DAILY
Status: DISCONTINUED | OUTPATIENT
Start: 2017-01-01 | End: 2017-01-01 | Stop reason: HOSPADM

## 2017-01-01 RX ADMIN — VANCOMYCIN HYDROCHLORIDE 750 MG: 750 INJECTION, SOLUTION INTRAVENOUS at 10:53

## 2017-01-01 RX ADMIN — VANCOMYCIN HYDROCHLORIDE 750 MG: 750 INJECTION, SOLUTION INTRAVENOUS at 13:58

## 2017-01-01 RX ADMIN — Medication 300 UNITS: at 14:40

## 2017-01-01 RX ADMIN — SODIUM CHLORIDE 20 ML/HR: 0.9 INJECTION, SOLUTION INTRAVENOUS at 14:33

## 2017-01-01 RX ADMIN — TBO-FILGRASTIM 300 MCG: 300 INJECTION, SOLUTION SUBCUTANEOUS at 11:54

## 2017-01-01 RX ADMIN — HYDROCODONE BITARTRATE AND ACETAMINOPHEN 1 TABLET: 5; 325 TABLET ORAL at 14:13

## 2017-01-01 RX ADMIN — DECITABINE 30 MG: 50 INJECTION, POWDER, LYOPHILIZED, FOR SOLUTION INTRAVENOUS at 13:33

## 2017-01-01 RX ADMIN — Medication 300 UNITS: at 13:18

## 2017-01-01 RX ADMIN — BIMATOPROST 1 DROP: 0.1 SOLUTION/ DROPS OPHTHALMIC at 00:13

## 2017-01-01 RX ADMIN — ONDANSETRON 4 MG: 2 INJECTION INTRAMUSCULAR; INTRAVENOUS at 04:35

## 2017-01-01 RX ADMIN — HYDROCODONE BITARTRATE AND ACETAMINOPHEN 1 TABLET: 5; 325 TABLET ORAL at 10:50

## 2017-01-01 RX ADMIN — GUAIFENESIN AND DEXTROMETHORPHAN 10 ML: 100; 10 SYRUP ORAL at 19:02

## 2017-01-01 RX ADMIN — HYDROCODONE BITARTRATE AND ACETAMINOPHEN 1 TABLET: 5; 325 TABLET ORAL at 05:19

## 2017-01-01 RX ADMIN — DECITABINE 29 MG: 50 INJECTION, POWDER, LYOPHILIZED, FOR SOLUTION INTRAVENOUS at 14:05

## 2017-01-01 RX ADMIN — GUAIFENESIN AND DEXTROMETHORPHAN 10 ML: 100; 10 SYRUP ORAL at 21:21

## 2017-01-01 RX ADMIN — ONDANSETRON 4 MG: 2 INJECTION INTRAMUSCULAR; INTRAVENOUS at 04:32

## 2017-01-01 RX ADMIN — BACITRACIN 1 SMALL APPLICATION: 500 OINTMENT TOPICAL at 08:31

## 2017-01-01 RX ADMIN — DORZOLAMIDE HYDROCHLORIDE AND TIMOLOL MALEATE 1 DROP: 20; 5 SOLUTION/ DROPS OPHTHALMIC at 09:07

## 2017-01-01 RX ADMIN — CEFEPIME HYDROCHLORIDE 1000 MG: 1 INJECTION, POWDER, FOR SOLUTION INTRAMUSCULAR; INTRAVENOUS at 09:11

## 2017-01-01 RX ADMIN — ONDANSETRON 4 MG: 2 INJECTION INTRAMUSCULAR; INTRAVENOUS at 09:48

## 2017-01-01 RX ADMIN — CEFEPIME HYDROCHLORIDE 1000 MG: 1 INJECTION, POWDER, FOR SOLUTION INTRAMUSCULAR; INTRAVENOUS at 05:47

## 2017-01-01 RX ADMIN — METRONIDAZOLE 500 MG: 500 TABLET ORAL at 21:40

## 2017-01-01 RX ADMIN — SODIUM CHLORIDE 500 ML: 0.9 INJECTION, SOLUTION INTRAVENOUS at 18:22

## 2017-01-01 RX ADMIN — HYDROCODONE BITARTRATE AND ACETAMINOPHEN 1 TABLET: 5; 325 TABLET ORAL at 11:10

## 2017-01-01 RX ADMIN — LIDOCAINE 1 PATCH: 50 PATCH CUTANEOUS at 09:07

## 2017-01-01 RX ADMIN — DORZOLAMIDE HYDROCHLORIDE AND TIMOLOL MALEATE 1 DROP: 20; 5 SOLUTION/ DROPS OPHTHALMIC at 21:02

## 2017-01-01 RX ADMIN — GUAIFENESIN 600 MG: 600 TABLET, EXTENDED RELEASE ORAL at 21:24

## 2017-01-01 RX ADMIN — TBO-FILGRASTIM 300 MCG: 300 INJECTION, SOLUTION SUBCUTANEOUS at 09:11

## 2017-01-01 RX ADMIN — METRONIDAZOLE 500 MG: 500 TABLET ORAL at 05:35

## 2017-01-01 RX ADMIN — GUAIFENESIN 600 MG: 600 TABLET, EXTENDED RELEASE ORAL at 21:33

## 2017-01-01 RX ADMIN — PREDNISONE 10 MG: 10 TABLET ORAL at 09:11

## 2017-01-01 RX ADMIN — HYDROCODONE BITARTRATE AND ACETAMINOPHEN 1 TABLET: 5; 325 TABLET ORAL at 14:28

## 2017-01-01 RX ADMIN — CEFEPIME HYDROCHLORIDE 1000 MG: 1 INJECTION, POWDER, FOR SOLUTION INTRAMUSCULAR; INTRAVENOUS at 05:29

## 2017-01-01 RX ADMIN — AMLODIPINE BESYLATE 2.5 MG: 2.5 TABLET ORAL at 09:00

## 2017-01-01 RX ADMIN — METRONIDAZOLE 500 MG: 500 TABLET ORAL at 14:21

## 2017-01-01 RX ADMIN — LIDOCAINE 1 PATCH: 50 PATCH CUTANEOUS at 07:43

## 2017-01-01 RX ADMIN — HYDROCODONE BITARTRATE AND ACETAMINOPHEN 1 TABLET: 5; 325 TABLET ORAL at 05:16

## 2017-01-01 RX ADMIN — DIPHENHYDRAMINE HYDROCHLORIDE 12.5 MG: 50 INJECTION, SOLUTION INTRAMUSCULAR; INTRAVENOUS at 17:27

## 2017-01-01 RX ADMIN — GUAIFENESIN 600 MG: 600 TABLET, EXTENDED RELEASE ORAL at 09:23

## 2017-01-01 RX ADMIN — AMLODIPINE BESYLATE 2.5 MG: 2.5 TABLET ORAL at 18:17

## 2017-01-01 RX ADMIN — METRONIDAZOLE 500 MG: 500 TABLET ORAL at 14:54

## 2017-01-01 RX ADMIN — DORZOLAMIDE HYDROCHLORIDE AND TIMOLOL MALEATE 1 DROP: 20; 5 SOLUTION/ DROPS OPHTHALMIC at 10:00

## 2017-01-01 RX ADMIN — ONDANSETRON 16 MG: 2 INJECTION INTRAMUSCULAR; INTRAVENOUS at 13:32

## 2017-01-01 RX ADMIN — SODIUM CHLORIDE 20 ML/HR: 0.9 INJECTION, SOLUTION INTRAVENOUS at 13:07

## 2017-01-01 RX ADMIN — GUAIFENESIN 600 MG: 600 TABLET, EXTENDED RELEASE ORAL at 10:11

## 2017-01-01 RX ADMIN — PREDNISONE 10 MG: 10 TABLET ORAL at 18:17

## 2017-01-01 RX ADMIN — TBO-FILGRASTIM 300 MCG: 300 INJECTION, SOLUTION SUBCUTANEOUS at 10:01

## 2017-01-01 RX ADMIN — SODIUM CHLORIDE 20 ML/HR: 0.9 INJECTION, SOLUTION INTRAVENOUS at 13:19

## 2017-01-01 RX ADMIN — METRONIDAZOLE 500 MG: 500 TABLET ORAL at 06:21

## 2017-01-01 RX ADMIN — PREDNISONE 10 MG: 10 TABLET ORAL at 10:33

## 2017-01-01 RX ADMIN — METRONIDAZOLE 500 MG: 500 TABLET ORAL at 22:41

## 2017-01-01 RX ADMIN — CEFEPIME HYDROCHLORIDE 1000 MG: 1 INJECTION, POWDER, FOR SOLUTION INTRAMUSCULAR; INTRAVENOUS at 10:00

## 2017-01-01 RX ADMIN — SODIUM CHLORIDE 20 ML/HR: 0.9 INJECTION, SOLUTION INTRAVENOUS at 13:30

## 2017-01-01 RX ADMIN — HYDROCODONE BITARTRATE AND ACETAMINOPHEN 1 TABLET: 5; 325 TABLET ORAL at 18:04

## 2017-01-01 RX ADMIN — GUAIFENESIN 600 MG: 600 TABLET, EXTENDED RELEASE ORAL at 09:59

## 2017-01-01 RX ADMIN — HYDROCODONE BITARTRATE AND ACETAMINOPHEN 1 TABLET: 5; 325 TABLET ORAL at 23:46

## 2017-01-01 RX ADMIN — METRONIDAZOLE 500 MG: 500 TABLET ORAL at 21:36

## 2017-01-01 RX ADMIN — FUROSEMIDE 20 MG: 10 INJECTION, SOLUTION INTRAMUSCULAR; INTRAVENOUS at 12:03

## 2017-01-01 RX ADMIN — GUAIFENESIN 600 MG: 600 TABLET, EXTENDED RELEASE ORAL at 20:53

## 2017-01-01 RX ADMIN — HYDROCODONE BITARTRATE AND ACETAMINOPHEN 1 TABLET: 5; 325 TABLET ORAL at 13:04

## 2017-01-01 RX ADMIN — Medication 300 UNITS: at 14:01

## 2017-01-01 RX ADMIN — METRONIDAZOLE 500 MG: 500 TABLET ORAL at 13:54

## 2017-01-01 RX ADMIN — HYDROCODONE BITARTRATE AND ACETAMINOPHEN 1 TABLET: 5; 325 TABLET ORAL at 09:06

## 2017-01-01 RX ADMIN — BACITRACIN 1 SMALL APPLICATION: 500 OINTMENT TOPICAL at 09:52

## 2017-01-01 RX ADMIN — METRONIDAZOLE 500 MG: 500 TABLET ORAL at 21:33

## 2017-01-01 RX ADMIN — HYDROCODONE BITARTRATE AND ACETAMINOPHEN 1 TABLET: 5; 325 TABLET ORAL at 04:43

## 2017-01-01 RX ADMIN — GUAIFENESIN 600 MG: 600 TABLET, EXTENDED RELEASE ORAL at 08:30

## 2017-01-01 RX ADMIN — METRONIDAZOLE 500 MG: 500 TABLET ORAL at 14:14

## 2017-01-01 RX ADMIN — DECITABINE 29 MG: 50 INJECTION, POWDER, LYOPHILIZED, FOR SOLUTION INTRAVENOUS at 14:08

## 2017-01-01 RX ADMIN — DORZOLAMIDE HYDROCHLORIDE AND TIMOLOL MALEATE 1 DROP: 20; 5 SOLUTION/ DROPS OPHTHALMIC at 10:03

## 2017-01-01 RX ADMIN — SODIUM CHLORIDE 20 ML/HR: 0.9 INJECTION, SOLUTION INTRAVENOUS at 13:16

## 2017-01-01 RX ADMIN — GUAIFENESIN 600 MG: 600 TABLET, EXTENDED RELEASE ORAL at 20:29

## 2017-01-01 RX ADMIN — ONDANSETRON 4 MG: 2 INJECTION INTRAMUSCULAR; INTRAVENOUS at 05:50

## 2017-01-01 RX ADMIN — LIDOCAINE HYDROCHLORIDE 10 ML: 20 SOLUTION ORAL; TOPICAL at 10:36

## 2017-01-01 RX ADMIN — PREDNISONE 10 MG: 10 TABLET ORAL at 18:18

## 2017-01-01 RX ADMIN — TBO-FILGRASTIM 252 MCG: 300 INJECTION, SOLUTION SUBCUTANEOUS at 18:18

## 2017-01-01 RX ADMIN — HYDROCODONE BITARTRATE AND ACETAMINOPHEN 1 TABLET: 5; 325 TABLET ORAL at 01:28

## 2017-01-01 RX ADMIN — CEFEPIME HYDROCHLORIDE 1000 MG: 1 INJECTION, POWDER, FOR SOLUTION INTRAMUSCULAR; INTRAVENOUS at 05:45

## 2017-01-01 RX ADMIN — DIPHENHYDRAMINE HYDROCHLORIDE 25 MG: 50 INJECTION, SOLUTION INTRAMUSCULAR; INTRAVENOUS at 11:52

## 2017-01-01 RX ADMIN — HYDROCODONE BITARTRATE AND ACETAMINOPHEN 1 TABLET: 5; 325 TABLET ORAL at 05:25

## 2017-01-01 RX ADMIN — VANCOMYCIN HYDROCHLORIDE 750 MG: 750 INJECTION, SOLUTION INTRAVENOUS at 09:06

## 2017-01-01 RX ADMIN — HYDROCODONE BITARTRATE AND ACETAMINOPHEN 1 TABLET: 5; 325 TABLET ORAL at 14:29

## 2017-01-01 RX ADMIN — DECITABINE 29 MG: 50 INJECTION, POWDER, LYOPHILIZED, FOR SOLUTION INTRAVENOUS at 13:56

## 2017-01-01 RX ADMIN — SODIUM CHLORIDE 100 ML/HR: 0.9 INJECTION, SOLUTION INTRAVENOUS at 09:38

## 2017-01-01 RX ADMIN — GUAIFENESIN AND DEXTROMETHORPHAN 10 ML: 100; 10 SYRUP ORAL at 10:56

## 2017-01-01 RX ADMIN — METRONIDAZOLE 500 MG: 500 INJECTION, SOLUTION INTRAVENOUS at 19:00

## 2017-01-01 RX ADMIN — DORZOLAMIDE HYDROCHLORIDE AND TIMOLOL MALEATE 1 DROP: 20; 5 SOLUTION/ DROPS OPHTHALMIC at 08:52

## 2017-01-01 RX ADMIN — PREDNISONE 10 MG: 10 TABLET ORAL at 18:36

## 2017-01-01 RX ADMIN — DIPHENHYDRAMINE HCL 25 MG: 25 TABLET ORAL at 11:10

## 2017-01-01 RX ADMIN — POTASSIUM CHLORIDE 40 MEQ: 1500 TABLET, EXTENDED RELEASE ORAL at 08:30

## 2017-01-01 RX ADMIN — GUAIFENESIN AND DEXTROMETHORPHAN 10 ML: 100; 10 SYRUP ORAL at 02:41

## 2017-01-01 RX ADMIN — VANCOMYCIN HYDROCHLORIDE 750 MG: 750 INJECTION, SOLUTION INTRAVENOUS at 02:26

## 2017-01-01 RX ADMIN — TBO-FILGRASTIM 300 MCG: 300 INJECTION, SOLUTION SUBCUTANEOUS at 08:52

## 2017-01-01 RX ADMIN — PREDNISONE 10 MG: 10 TABLET ORAL at 09:04

## 2017-01-01 RX ADMIN — PREDNISONE 10 MG: 10 TABLET ORAL at 16:46

## 2017-01-01 RX ADMIN — HYDROCODONE BITARTRATE AND ACETAMINOPHEN 1 TABLET: 5; 325 TABLET ORAL at 13:58

## 2017-01-01 RX ADMIN — HYDROCODONE BITARTRATE AND ACETAMINOPHEN 1 TABLET: 5; 325 TABLET ORAL at 22:41

## 2017-01-01 RX ADMIN — DORZOLAMIDE HYDROCHLORIDE AND TIMOLOL MALEATE 1 DROP: 20; 5 SOLUTION/ DROPS OPHTHALMIC at 23:51

## 2017-01-01 RX ADMIN — BIMATOPROST 1 DROP: 0.1 SOLUTION/ DROPS OPHTHALMIC at 21:02

## 2017-01-01 RX ADMIN — HYDROCODONE BITARTRATE AND ACETAMINOPHEN 1 TABLET: 5; 325 TABLET ORAL at 00:35

## 2017-01-01 RX ADMIN — METRONIDAZOLE 500 MG: 500 TABLET ORAL at 13:58

## 2017-01-01 RX ADMIN — Medication 300 UNITS: at 15:13

## 2017-01-01 RX ADMIN — BACITRACIN 1 SMALL APPLICATION: 500 OINTMENT TOPICAL at 08:11

## 2017-01-01 RX ADMIN — GUAIFENESIN AND DEXTROMETHORPHAN 10 ML: 100; 10 SYRUP ORAL at 05:15

## 2017-01-01 RX ADMIN — FLUCONAZOLE 200 MG: 200 TABLET ORAL at 21:03

## 2017-01-01 RX ADMIN — BIMATOPROST 1 DROP: 0.1 SOLUTION/ DROPS OPHTHALMIC at 21:47

## 2017-01-01 RX ADMIN — GUAIFENESIN 600 MG: 600 TABLET, EXTENDED RELEASE ORAL at 08:52

## 2017-01-01 RX ADMIN — METRONIDAZOLE 500 MG: 500 INJECTION, SOLUTION INTRAVENOUS at 10:46

## 2017-01-01 RX ADMIN — DORZOLAMIDE HYDROCHLORIDE AND TIMOLOL MALEATE 1 DROP: 20; 5 SOLUTION/ DROPS OPHTHALMIC at 21:28

## 2017-01-01 RX ADMIN — HYDROCODONE BITARTRATE AND ACETAMINOPHEN 1 TABLET: 5; 325 TABLET ORAL at 07:45

## 2017-01-01 RX ADMIN — LIDOCAINE 1 PATCH: 50 PATCH CUTANEOUS at 10:14

## 2017-01-01 RX ADMIN — HYDROCODONE BITARTRATE AND ACETAMINOPHEN 1 TABLET: 5; 325 TABLET ORAL at 10:35

## 2017-01-01 RX ADMIN — GUAIFENESIN AND DEXTROMETHORPHAN 10 ML: 100; 10 SYRUP ORAL at 00:26

## 2017-01-01 RX ADMIN — GUAIFENESIN 600 MG: 600 TABLET, EXTENDED RELEASE ORAL at 09:06

## 2017-01-01 RX ADMIN — Medication 300 UNITS: at 15:18

## 2017-01-01 RX ADMIN — METRONIDAZOLE 500 MG: 500 INJECTION, SOLUTION INTRAVENOUS at 19:23

## 2017-01-01 RX ADMIN — GUAIFENESIN AND DEXTROMETHORPHAN 10 ML: 100; 10 SYRUP ORAL at 21:41

## 2017-01-01 RX ADMIN — GUAIFENESIN 600 MG: 600 TABLET, EXTENDED RELEASE ORAL at 10:35

## 2017-01-01 RX ADMIN — HYDROCODONE BITARTRATE AND ACETAMINOPHEN 1 TABLET: 5; 325 TABLET ORAL at 21:24

## 2017-01-01 RX ADMIN — HYDROCODONE BITARTRATE AND ACETAMINOPHEN 1 TABLET: 5; 325 TABLET ORAL at 13:53

## 2017-01-01 RX ADMIN — GUAIFENESIN 600 MG: 600 TABLET, EXTENDED RELEASE ORAL at 09:52

## 2017-01-01 RX ADMIN — HYDROCODONE BITARTRATE AND ACETAMINOPHEN 1 TABLET: 5; 325 TABLET ORAL at 12:58

## 2017-01-01 RX ADMIN — HYDROCODONE BITARTRATE AND ACETAMINOPHEN 1 TABLET: 5; 325 TABLET ORAL at 14:35

## 2017-01-01 RX ADMIN — SODIUM CHLORIDE 20 ML/HR: 0.9 INJECTION, SOLUTION INTRAVENOUS at 13:32

## 2017-01-01 RX ADMIN — BACITRACIN 1 SMALL APPLICATION: 500 OINTMENT TOPICAL at 18:18

## 2017-01-01 RX ADMIN — SODIUM CHLORIDE 100 ML/HR: 0.9 INJECTION, SOLUTION INTRAVENOUS at 09:18

## 2017-01-01 RX ADMIN — DORZOLAMIDE HYDROCHLORIDE AND TIMOLOL MALEATE 1 DROP: 20; 5 SOLUTION/ DROPS OPHTHALMIC at 20:54

## 2017-01-01 RX ADMIN — HYDROCODONE BITARTRATE AND ACETAMINOPHEN 1 TABLET: 5; 325 TABLET ORAL at 18:16

## 2017-01-01 RX ADMIN — HYDROCORTISONE SODIUM SUCCINATE 100 MG: 100 INJECTION, POWDER, FOR SOLUTION INTRAMUSCULAR; INTRAVENOUS at 11:50

## 2017-01-01 RX ADMIN — PREDNISONE 10 MG: 10 TABLET ORAL at 18:29

## 2017-01-01 RX ADMIN — DORZOLAMIDE HYDROCHLORIDE AND TIMOLOL MALEATE 1 DROP: 20; 5 SOLUTION/ DROPS OPHTHALMIC at 21:26

## 2017-01-01 RX ADMIN — ACETAMINOPHEN 650 MG: 325 TABLET, FILM COATED ORAL at 20:03

## 2017-01-01 RX ADMIN — PREDNISONE 10 MG: 10 TABLET ORAL at 09:00

## 2017-01-01 RX ADMIN — LIDOCAINE 1 PATCH: 50 PATCH CUTANEOUS at 10:00

## 2017-01-01 RX ADMIN — LIDOCAINE 1 PATCH: 50 PATCH CUTANEOUS at 09:00

## 2017-01-01 RX ADMIN — METRONIDAZOLE 500 MG: 500 INJECTION, SOLUTION INTRAVENOUS at 00:01

## 2017-01-01 RX ADMIN — HYDROCODONE BITARTRATE AND ACETAMINOPHEN 1 TABLET: 5; 325 TABLET ORAL at 20:31

## 2017-01-01 RX ADMIN — FLUCONAZOLE 200 MG: 200 TABLET ORAL at 21:47

## 2017-01-01 RX ADMIN — GUAIFENESIN AND DEXTROMETHORPHAN 10 ML: 100; 10 SYRUP ORAL at 18:06

## 2017-01-01 RX ADMIN — METRONIDAZOLE 500 MG: 500 TABLET ORAL at 05:47

## 2017-01-01 RX ADMIN — METRONIDAZOLE 500 MG: 500 TABLET ORAL at 05:16

## 2017-01-01 RX ADMIN — METRONIDAZOLE 500 MG: 500 TABLET ORAL at 06:00

## 2017-01-01 RX ADMIN — BACITRACIN 1 SMALL APPLICATION: 500 OINTMENT TOPICAL at 18:45

## 2017-01-01 RX ADMIN — METRONIDAZOLE 500 MG: 500 TABLET ORAL at 06:28

## 2017-01-01 RX ADMIN — METRONIDAZOLE 500 MG: 500 TABLET ORAL at 05:45

## 2017-01-01 RX ADMIN — GUAIFENESIN 600 MG: 600 TABLET, EXTENDED RELEASE ORAL at 08:11

## 2017-01-01 RX ADMIN — HYDROCODONE BITARTRATE AND ACETAMINOPHEN 1 TABLET: 5; 325 TABLET ORAL at 11:32

## 2017-01-01 RX ADMIN — VANCOMYCIN HYDROCHLORIDE 750 MG: 750 INJECTION, SOLUTION INTRAVENOUS at 20:30

## 2017-01-01 RX ADMIN — HYDROCODONE BITARTRATE AND ACETAMINOPHEN 1 TABLET: 5; 325 TABLET ORAL at 05:35

## 2017-01-01 RX ADMIN — HYDROCODONE BITARTRATE AND ACETAMINOPHEN 1 TABLET: 5; 325 TABLET ORAL at 02:36

## 2017-01-01 RX ADMIN — ONDANSETRON 16 MG: 2 INJECTION INTRAMUSCULAR; INTRAVENOUS at 14:09

## 2017-01-01 RX ADMIN — PREDNISONE 10 MG: 10 TABLET ORAL at 18:16

## 2017-01-01 RX ADMIN — METRONIDAZOLE 500 MG: 500 TABLET ORAL at 21:37

## 2017-01-01 RX ADMIN — AMLODIPINE BESYLATE 2.5 MG: 2.5 TABLET ORAL at 08:52

## 2017-01-01 RX ADMIN — ACETAMINOPHEN 650 MG: 325 TABLET, FILM COATED ORAL at 12:44

## 2017-01-01 RX ADMIN — FUROSEMIDE 20 MG: 10 INJECTION, SOLUTION INTRAMUSCULAR; INTRAVENOUS at 14:39

## 2017-01-01 RX ADMIN — SODIUM CHLORIDE 20 ML/HR: 0.9 INJECTION, SOLUTION INTRAVENOUS at 14:10

## 2017-01-01 RX ADMIN — ONDANSETRON 16 MG: 2 INJECTION INTRAMUSCULAR; INTRAVENOUS at 13:37

## 2017-01-01 RX ADMIN — DORZOLAMIDE HYDROCHLORIDE AND TIMOLOL MALEATE 1 DROP: 20; 5 SOLUTION/ DROPS OPHTHALMIC at 20:25

## 2017-01-01 RX ADMIN — LIDOCAINE 1 PATCH: 50 PATCH CUTANEOUS at 08:52

## 2017-01-01 RX ADMIN — GUAIFENESIN AND DEXTROMETHORPHAN 10 ML: 100; 10 SYRUP ORAL at 14:56

## 2017-01-01 RX ADMIN — GUAIFENESIN 600 MG: 600 TABLET, EXTENDED RELEASE ORAL at 20:14

## 2017-01-01 RX ADMIN — GUAIFENESIN AND DEXTROMETHORPHAN 10 ML: 100; 10 SYRUP ORAL at 20:35

## 2017-01-01 RX ADMIN — HYDROCODONE BITARTRATE AND ACETAMINOPHEN 1 TABLET: 5; 325 TABLET ORAL at 06:29

## 2017-01-01 RX ADMIN — ONDANSETRON 4 MG: 2 INJECTION INTRAMUSCULAR; INTRAVENOUS at 23:51

## 2017-01-01 RX ADMIN — FLUCONAZOLE 200 MG: 200 TABLET ORAL at 22:28

## 2017-01-01 RX ADMIN — ONDANSETRON 16 MG: 2 INJECTION INTRAMUSCULAR; INTRAVENOUS at 13:36

## 2017-01-01 RX ADMIN — GUAIFENESIN AND DEXTROMETHORPHAN 10 ML: 100; 10 SYRUP ORAL at 13:38

## 2017-01-01 RX ADMIN — TBO-FILGRASTIM 252 MCG: 300 INJECTION, SOLUTION SUBCUTANEOUS at 18:38

## 2017-01-01 RX ADMIN — MECLIZINE HCL 12.5 MG: 12.5 TABLET ORAL at 11:38

## 2017-01-01 RX ADMIN — Medication 300 UNITS: at 14:59

## 2017-01-01 RX ADMIN — CEFEPIME HYDROCHLORIDE 1000 MG: 1 INJECTION, POWDER, FOR SOLUTION INTRAMUSCULAR; INTRAVENOUS at 18:37

## 2017-01-01 RX ADMIN — BIMATOPROST 1 DROP: 0.1 SOLUTION/ DROPS OPHTHALMIC at 22:31

## 2017-01-01 RX ADMIN — METRONIDAZOLE 500 MG: 500 TABLET ORAL at 14:28

## 2017-01-01 RX ADMIN — ONDANSETRON 16 MG: 2 INJECTION INTRAMUSCULAR; INTRAVENOUS at 13:17

## 2017-01-01 RX ADMIN — HYDROCODONE BITARTRATE AND ACETAMINOPHEN 1 TABLET: 5; 325 TABLET ORAL at 16:53

## 2017-01-01 RX ADMIN — PREDNISONE 10 MG: 10 TABLET ORAL at 09:06

## 2017-01-01 RX ADMIN — ACETAMINOPHEN 650 MG: 325 TABLET, FILM COATED ORAL at 11:10

## 2017-01-01 RX ADMIN — FUROSEMIDE 20 MG: 20 TABLET ORAL at 14:30

## 2017-01-01 RX ADMIN — Medication 300 UNITS: at 13:12

## 2017-01-01 RX ADMIN — CEFEPIME HYDROCHLORIDE 1000 MG: 1 INJECTION, POWDER, FOR SOLUTION INTRAMUSCULAR; INTRAVENOUS at 21:25

## 2017-01-01 RX ADMIN — CEFEPIME HYDROCHLORIDE 1000 MG: 1 INJECTION, POWDER, FOR SOLUTION INTRAMUSCULAR; INTRAVENOUS at 20:54

## 2017-01-01 RX ADMIN — METRONIDAZOLE 500 MG: 500 TABLET ORAL at 05:14

## 2017-01-01 RX ADMIN — ONDANSETRON 16 MG: 2 INJECTION INTRAMUSCULAR; INTRAVENOUS at 13:08

## 2017-01-01 RX ADMIN — ONDANSETRON 4 MG: 2 INJECTION INTRAMUSCULAR; INTRAVENOUS at 13:21

## 2017-01-01 RX ADMIN — HYDROCODONE BITARTRATE AND ACETAMINOPHEN 1 TABLET: 5; 325 TABLET ORAL at 18:56

## 2017-01-01 RX ADMIN — LIDOCAINE 1 PATCH: 50 PATCH CUTANEOUS at 10:36

## 2017-01-01 RX ADMIN — SODIUM CHLORIDE 100 ML/HR: 0.9 INJECTION, SOLUTION INTRAVENOUS at 17:33

## 2017-01-01 RX ADMIN — GUAIFENESIN 600 MG: 600 TABLET, EXTENDED RELEASE ORAL at 09:00

## 2017-01-01 RX ADMIN — HYDROCODONE BITARTRATE AND ACETAMINOPHEN 1 TABLET: 5; 325 TABLET ORAL at 16:46

## 2017-01-01 RX ADMIN — METRONIDAZOLE 500 MG: 500 TABLET ORAL at 21:25

## 2017-01-01 RX ADMIN — GUAIFENESIN 600 MG: 600 TABLET, EXTENDED RELEASE ORAL at 21:36

## 2017-01-01 RX ADMIN — METOCLOPRAMIDE 5 MG: 5 INJECTION, SOLUTION INTRAMUSCULAR; INTRAVENOUS at 08:30

## 2017-01-01 RX ADMIN — SODIUM CHLORIDE 20 ML/HR: 0.9 INJECTION, SOLUTION INTRAVENOUS at 13:22

## 2017-01-01 RX ADMIN — LIDOCAINE HYDROCHLORIDE 10 ML: 20 SOLUTION ORAL; TOPICAL at 09:05

## 2017-01-01 RX ADMIN — PREDNISONE 10 MG: 10 TABLET ORAL at 08:31

## 2017-01-01 RX ADMIN — CEFEPIME HYDROCHLORIDE 1000 MG: 1 INJECTION, POWDER, FOR SOLUTION INTRAMUSCULAR; INTRAVENOUS at 09:56

## 2017-01-01 RX ADMIN — BACITRACIN 1 SMALL APPLICATION: 500 OINTMENT TOPICAL at 09:08

## 2017-01-01 RX ADMIN — BACITRACIN 1 SMALL APPLICATION: 500 OINTMENT TOPICAL at 08:24

## 2017-01-01 RX ADMIN — METRONIDAZOLE 500 MG: 500 TABLET ORAL at 16:14

## 2017-01-01 RX ADMIN — DORZOLAMIDE HYDROCHLORIDE AND TIMOLOL MALEATE 1 DROP: 20; 5 SOLUTION/ DROPS OPHTHALMIC at 09:57

## 2017-01-01 RX ADMIN — HYDROCODONE BITARTRATE AND ACETAMINOPHEN 1 TABLET: 5; 325 TABLET ORAL at 22:38

## 2017-01-01 RX ADMIN — METRONIDAZOLE 500 MG: 500 TABLET ORAL at 21:02

## 2017-01-01 RX ADMIN — Medication 300 UNITS: at 14:19

## 2017-01-01 RX ADMIN — DECITABINE 29 MG: 50 INJECTION, POWDER, LYOPHILIZED, FOR SOLUTION INTRAVENOUS at 14:02

## 2017-01-01 RX ADMIN — SODIUM CHLORIDE 100 ML/HR: 0.9 INJECTION, SOLUTION INTRAVENOUS at 20:36

## 2017-01-01 RX ADMIN — DECITABINE 29 MG: 50 INJECTION, POWDER, LYOPHILIZED, FOR SOLUTION INTRAVENOUS at 13:59

## 2017-01-01 RX ADMIN — HYDROCODONE BITARTRATE AND ACETAMINOPHEN 1 TABLET: 5; 325 TABLET ORAL at 03:33

## 2017-01-01 RX ADMIN — Medication 300 UNITS: at 14:36

## 2017-01-01 RX ADMIN — Medication 300 UNITS: at 15:04

## 2017-01-01 RX ADMIN — DORZOLAMIDE HYDROCHLORIDE AND TIMOLOL MALEATE 1 DROP: 20; 5 SOLUTION/ DROPS OPHTHALMIC at 20:36

## 2017-01-01 RX ADMIN — LIDOCAINE 1 PATCH: 50 PATCH CUTANEOUS at 09:55

## 2017-01-01 RX ADMIN — BACITRACIN 1 SMALL APPLICATION: 500 OINTMENT TOPICAL at 18:24

## 2017-01-01 RX ADMIN — BACITRACIN 1 SMALL APPLICATION: 500 OINTMENT TOPICAL at 20:29

## 2017-01-01 RX ADMIN — CEFEPIME HYDROCHLORIDE 1000 MG: 1 INJECTION, POWDER, FOR SOLUTION INTRAMUSCULAR; INTRAVENOUS at 20:25

## 2017-01-01 RX ADMIN — HYDROCODONE BITARTRATE AND ACETAMINOPHEN 1 TABLET: 5; 325 TABLET ORAL at 16:14

## 2017-01-01 RX ADMIN — DORZOLAMIDE HYDROCHLORIDE AND TIMOLOL MALEATE 1 DROP: 20; 5 SOLUTION/ DROPS OPHTHALMIC at 09:04

## 2017-01-01 RX ADMIN — GUAIFENESIN AND DEXTROMETHORPHAN 10 ML: 100; 10 SYRUP ORAL at 09:52

## 2017-01-01 RX ADMIN — METRONIDAZOLE 500 MG: 500 INJECTION, SOLUTION INTRAVENOUS at 03:55

## 2017-01-01 RX ADMIN — AMLODIPINE BESYLATE 2.5 MG: 2.5 TABLET ORAL at 10:11

## 2017-01-01 RX ADMIN — GUAIFENESIN 600 MG: 600 TABLET, EXTENDED RELEASE ORAL at 22:45

## 2017-01-01 RX ADMIN — ACETAMINOPHEN 325 MG: 325 TABLET, FILM COATED ORAL at 11:51

## 2017-01-01 RX ADMIN — ACETAMINOPHEN 650 MG: 325 TABLET, FILM COATED ORAL at 22:07

## 2017-01-01 RX ADMIN — CEFEPIME HYDROCHLORIDE 1000 MG: 1 INJECTION, POWDER, FOR SOLUTION INTRAMUSCULAR; INTRAVENOUS at 18:05

## 2017-01-01 RX ADMIN — GUAIFENESIN AND DEXTROMETHORPHAN 10 ML: 100; 10 SYRUP ORAL at 13:02

## 2017-01-01 RX ADMIN — GUAIFENESIN AND DEXTROMETHORPHAN 10 ML: 100; 10 SYRUP ORAL at 14:13

## 2017-01-01 RX ADMIN — METRONIDAZOLE 500 MG: 500 TABLET ORAL at 21:03

## 2017-01-01 RX ADMIN — GUAIFENESIN AND DEXTROMETHORPHAN 10 ML: 100; 10 SYRUP ORAL at 09:11

## 2017-01-01 RX ADMIN — HYDROCORTISONE SODIUM SUCCINATE 100 MG: 100 INJECTION, POWDER, FOR SOLUTION INTRAMUSCULAR; INTRAVENOUS at 11:09

## 2017-01-01 RX ADMIN — LIDOCAINE 1 PATCH: 50 PATCH CUTANEOUS at 09:25

## 2017-01-01 RX ADMIN — VANCOMYCIN HYDROCHLORIDE 750 MG: 750 INJECTION, SOLUTION INTRAVENOUS at 21:25

## 2017-01-01 RX ADMIN — SODIUM CHLORIDE 20 ML/HR: 0.9 INJECTION, SOLUTION INTRAVENOUS at 14:30

## 2017-01-01 RX ADMIN — DECITABINE 29 MG: 50 INJECTION, POWDER, LYOPHILIZED, FOR SOLUTION INTRAVENOUS at 14:13

## 2017-01-01 RX ADMIN — CEFEPIME HYDROCHLORIDE 1000 MG: 1 INJECTION, POWDER, FOR SOLUTION INTRAMUSCULAR; INTRAVENOUS at 05:10

## 2017-01-01 RX ADMIN — HYDROCODONE BITARTRATE AND ACETAMINOPHEN 1 TABLET: 5; 325 TABLET ORAL at 09:27

## 2017-01-01 RX ADMIN — BIMATOPROST 1 DROP: 0.1 SOLUTION/ DROPS OPHTHALMIC at 22:45

## 2017-01-01 RX ADMIN — SODIUM CHLORIDE 1000 ML: 0.9 INJECTION, SOLUTION INTRAVENOUS at 13:20

## 2017-01-01 RX ADMIN — Medication 300 UNITS: at 13:41

## 2017-01-01 RX ADMIN — LIDOCAINE 1 PATCH: 50 PATCH CUTANEOUS at 08:21

## 2017-01-01 RX ADMIN — DORZOLAMIDE HYDROCHLORIDE AND TIMOLOL MALEATE 1 DROP: 20; 5 SOLUTION/ DROPS OPHTHALMIC at 21:37

## 2017-01-01 RX ADMIN — CEFEPIME 2000 MG: 2 INJECTION, POWDER, FOR SOLUTION INTRAMUSCULAR; INTRAVENOUS at 13:20

## 2017-01-01 RX ADMIN — DIPHENHYDRAMINE HYDROCHLORIDE 25 MG: 50 INJECTION, SOLUTION INTRAMUSCULAR; INTRAVENOUS at 18:09

## 2017-01-01 RX ADMIN — DORZOLAMIDE HYDROCHLORIDE AND TIMOLOL MALEATE 1 DROP: 20; 5 SOLUTION/ DROPS OPHTHALMIC at 21:22

## 2017-01-01 RX ADMIN — FLUCONAZOLE 200 MG: 200 TABLET ORAL at 21:25

## 2017-01-01 RX ADMIN — HYDROCODONE BITARTRATE AND ACETAMINOPHEN 1 TABLET: 5; 325 TABLET ORAL at 21:01

## 2017-01-01 RX ADMIN — METRONIDAZOLE 500 MG: 500 INJECTION, SOLUTION INTRAVENOUS at 03:51

## 2017-01-01 RX ADMIN — METRONIDAZOLE 500 MG: 500 TABLET ORAL at 14:35

## 2017-01-01 RX ADMIN — PREDNISONE 10 MG: 10 TABLET ORAL at 19:05

## 2017-01-01 RX ADMIN — GUAIFENESIN 600 MG: 600 TABLET, EXTENDED RELEASE ORAL at 21:25

## 2017-01-01 RX ADMIN — DORZOLAMIDE HYDROCHLORIDE AND TIMOLOL MALEATE 1 DROP: 20; 5 SOLUTION/ DROPS OPHTHALMIC at 10:38

## 2017-01-01 RX ADMIN — PREDNISONE 10 MG: 10 TABLET ORAL at 18:32

## 2017-01-01 RX ADMIN — HYDROCODONE BITARTRATE AND ACETAMINOPHEN 1 TABLET: 5; 325 TABLET ORAL at 20:29

## 2017-01-01 RX ADMIN — DECITABINE 30 MG: 50 INJECTION, POWDER, LYOPHILIZED, FOR SOLUTION INTRAVENOUS at 14:14

## 2017-01-01 RX ADMIN — DECITABINE 29 MG: 50 INJECTION, POWDER, LYOPHILIZED, FOR SOLUTION INTRAVENOUS at 15:40

## 2017-01-01 RX ADMIN — BACITRACIN 1 SMALL APPLICATION: 500 OINTMENT TOPICAL at 17:04

## 2017-01-01 RX ADMIN — PREDNISONE 10 MG: 10 TABLET ORAL at 18:56

## 2017-01-01 RX ADMIN — PREDNISONE 10 MG: 10 TABLET ORAL at 17:59

## 2017-01-01 RX ADMIN — Medication 300 UNITS: at 13:43

## 2017-01-01 RX ADMIN — HYDROCODONE BITARTRATE AND ACETAMINOPHEN 1 TABLET: 5; 325 TABLET ORAL at 06:28

## 2017-01-01 RX ADMIN — PREDNISONE 10 MG: 10 TABLET ORAL at 08:52

## 2017-01-01 RX ADMIN — METRONIDAZOLE 500 MG: 500 INJECTION, SOLUTION INTRAVENOUS at 10:33

## 2017-01-01 RX ADMIN — ONDANSETRON 16 MG: 2 INJECTION INTRAMUSCULAR; INTRAVENOUS at 13:29

## 2017-01-01 RX ADMIN — HYDROCODONE BITARTRATE AND ACETAMINOPHEN 1 TABLET: 5; 325 TABLET ORAL at 09:00

## 2017-01-01 RX ADMIN — GUAIFENESIN AND DEXTROMETHORPHAN 10 ML: 100; 10 SYRUP ORAL at 04:42

## 2017-01-01 RX ADMIN — Medication 300 UNITS: at 13:45

## 2017-01-01 RX ADMIN — VANCOMYCIN HYDROCHLORIDE 750 MG: 750 INJECTION, SOLUTION INTRAVENOUS at 08:31

## 2017-01-01 RX ADMIN — HYDROCODONE BITARTRATE AND ACETAMINOPHEN 1 TABLET: 5; 325 TABLET ORAL at 09:04

## 2017-01-01 RX ADMIN — Medication 300 UNITS: at 13:38

## 2017-01-01 RX ADMIN — GUAIFENESIN 600 MG: 600 TABLET, EXTENDED RELEASE ORAL at 20:31

## 2017-01-01 RX ADMIN — ONDANSETRON 16 MG: 2 INJECTION INTRAMUSCULAR; INTRAVENOUS at 13:22

## 2017-01-01 RX ADMIN — AMLODIPINE BESYLATE 2.5 MG: 2.5 TABLET ORAL at 08:11

## 2017-01-01 RX ADMIN — Medication 300 UNITS: at 14:07

## 2017-01-01 RX ADMIN — Medication 300 UNITS: at 15:34

## 2017-01-01 RX ADMIN — CEFEPIME HYDROCHLORIDE 1000 MG: 1 INJECTION, POWDER, FOR SOLUTION INTRAMUSCULAR; INTRAVENOUS at 18:18

## 2017-01-01 RX ADMIN — BACITRACIN 1 SMALL APPLICATION: 500 OINTMENT TOPICAL at 09:04

## 2017-01-01 RX ADMIN — ONDANSETRON 16 MG: 2 INJECTION INTRAMUSCULAR; INTRAVENOUS at 13:30

## 2017-01-01 RX ADMIN — CEFEPIME HYDROCHLORIDE 1000 MG: 1 INJECTION, POWDER, FOR SOLUTION INTRAMUSCULAR; INTRAVENOUS at 08:02

## 2017-01-01 RX ADMIN — CEFEPIME HYDROCHLORIDE 1000 MG: 1 INJECTION, POWDER, FOR SOLUTION INTRAMUSCULAR; INTRAVENOUS at 17:00

## 2017-01-01 RX ADMIN — DORZOLAMIDE HYDROCHLORIDE AND TIMOLOL MALEATE 1 DROP: 20; 5 SOLUTION/ DROPS OPHTHALMIC at 08:11

## 2017-01-01 RX ADMIN — DIPHENHYDRAMINE HCL 25 MG: 25 TABLET ORAL at 20:03

## 2017-01-01 RX ADMIN — HYDROCODONE BITARTRATE AND ACETAMINOPHEN 1 TABLET: 5; 325 TABLET ORAL at 13:43

## 2017-01-01 RX ADMIN — GUAIFENESIN AND DEXTROMETHORPHAN 10 ML: 100; 10 SYRUP ORAL at 05:24

## 2017-01-01 RX ADMIN — GUAIFENESIN 600 MG: 600 TABLET, EXTENDED RELEASE ORAL at 09:36

## 2017-01-01 RX ADMIN — DORZOLAMIDE HYDROCHLORIDE AND TIMOLOL MALEATE 1 DROP: 20; 5 SOLUTION/ DROPS OPHTHALMIC at 08:32

## 2017-01-01 RX ADMIN — HYDROCODONE BITARTRATE AND ACETAMINOPHEN 1 TABLET: 5; 325 TABLET ORAL at 14:21

## 2017-01-01 RX ADMIN — Medication 300 UNITS: at 15:06

## 2017-01-01 RX ADMIN — ONDANSETRON 16 MG: 2 INJECTION INTRAMUSCULAR; INTRAVENOUS at 14:33

## 2017-01-01 RX ADMIN — HYDROCODONE BITARTRATE AND ACETAMINOPHEN 1 TABLET: 5; 325 TABLET ORAL at 18:03

## 2017-01-01 RX ADMIN — TBO-FILGRASTIM 252 MCG: 300 INJECTION, SOLUTION SUBCUTANEOUS at 20:33

## 2017-01-01 RX ADMIN — CEFEPIME HYDROCHLORIDE 1000 MG: 1 INJECTION, POWDER, FOR SOLUTION INTRAMUSCULAR; INTRAVENOUS at 05:16

## 2017-01-01 RX ADMIN — IOHEXOL 75 ML: 350 INJECTION, SOLUTION INTRAVENOUS at 15:21

## 2017-01-01 RX ADMIN — HYDROCODONE BITARTRATE AND ACETAMINOPHEN 1 TABLET: 5; 325 TABLET ORAL at 13:00

## 2017-01-01 RX ADMIN — PREDNISONE 10 MG: 10 TABLET ORAL at 09:52

## 2017-01-01 RX ADMIN — PREDNISONE 10 MG: 10 TABLET ORAL at 09:24

## 2017-01-01 RX ADMIN — DORZOLAMIDE HYDROCHLORIDE AND TIMOLOL MALEATE 1 DROP: 20; 5 SOLUTION/ DROPS OPHTHALMIC at 09:28

## 2017-01-01 RX ADMIN — HYDROCODONE BITARTRATE AND ACETAMINOPHEN 1 TABLET: 5; 325 TABLET ORAL at 21:45

## 2017-01-01 RX ADMIN — GUAIFENESIN AND DEXTROMETHORPHAN 10 ML: 100; 10 SYRUP ORAL at 21:43

## 2017-01-01 RX ADMIN — DORZOLAMIDE HYDROCHLORIDE AND TIMOLOL MALEATE 1 DROP: 20; 5 SOLUTION/ DROPS OPHTHALMIC at 22:24

## 2017-01-01 RX ADMIN — TBO-FILGRASTIM 300 MCG: 300 INJECTION, SOLUTION SUBCUTANEOUS at 09:57

## 2017-01-01 RX ADMIN — AMLODIPINE BESYLATE 2.5 MG: 2.5 TABLET ORAL at 09:59

## 2017-01-01 RX ADMIN — GUAIFENESIN AND DEXTROMETHORPHAN 10 ML: 100; 10 SYRUP ORAL at 09:10

## 2017-01-01 RX ADMIN — HYDROCODONE BITARTRATE AND ACETAMINOPHEN 1 TABLET: 5; 325 TABLET ORAL at 10:10

## 2017-01-01 RX ADMIN — ONDANSETRON 16 MG: 2 INJECTION INTRAMUSCULAR; INTRAVENOUS at 13:19

## 2017-01-01 RX ADMIN — HYDROCORTISONE SODIUM SUCCINATE 100 MG: 100 INJECTION, POWDER, FOR SOLUTION INTRAMUSCULAR; INTRAVENOUS at 20:03

## 2017-01-01 RX ADMIN — SODIUM CHLORIDE 20 ML/HR: 0.9 INJECTION, SOLUTION INTRAVENOUS at 12:30

## 2017-01-01 RX ADMIN — HYDROCORTISONE SODIUM SUCCINATE 100 MG: 100 INJECTION, POWDER, FOR SOLUTION INTRAMUSCULAR; INTRAVENOUS at 12:57

## 2017-01-01 RX ADMIN — ONDANSETRON 16 MG: 2 INJECTION INTRAMUSCULAR; INTRAVENOUS at 13:27

## 2017-01-01 RX ADMIN — BACITRACIN 1 SMALL APPLICATION: 500 OINTMENT TOPICAL at 10:33

## 2017-01-01 RX ADMIN — ONDANSETRON 4 MG: 2 INJECTION INTRAMUSCULAR; INTRAVENOUS at 05:24

## 2017-01-01 RX ADMIN — CEFEPIME HYDROCHLORIDE 1000 MG: 1 INJECTION, POWDER, FOR SOLUTION INTRAMUSCULAR; INTRAVENOUS at 05:13

## 2017-01-01 RX ADMIN — GUAIFENESIN AND DEXTROMETHORPHAN 10 ML: 100; 10 SYRUP ORAL at 10:29

## 2017-01-01 RX ADMIN — FLUCONAZOLE 200 MG: 200 TABLET ORAL at 20:25

## 2017-01-01 RX ADMIN — GUAIFENESIN 600 MG: 600 TABLET, EXTENDED RELEASE ORAL at 09:04

## 2017-01-01 RX ADMIN — HYDROCODONE BITARTRATE AND ACETAMINOPHEN 1 TABLET: 5; 325 TABLET ORAL at 05:12

## 2017-01-01 RX ADMIN — CEFEPIME HYDROCHLORIDE 1000 MG: 1 INJECTION, POWDER, FOR SOLUTION INTRAMUSCULAR; INTRAVENOUS at 21:30

## 2017-01-01 RX ADMIN — HYDROCORTISONE SODIUM SUCCINATE 50 MG: 100 INJECTION, POWDER, FOR SOLUTION INTRAMUSCULAR; INTRAVENOUS at 12:35

## 2017-01-01 RX ADMIN — PREDNISONE 10 MG: 10 TABLET ORAL at 10:00

## 2017-01-01 RX ADMIN — GUAIFENESIN AND DEXTROMETHORPHAN 10 ML: 100; 10 SYRUP ORAL at 10:20

## 2017-01-01 RX ADMIN — DIPHENHYDRAMINE HYDROCHLORIDE 12.5 MG: 50 INJECTION, SOLUTION INTRAMUSCULAR; INTRAVENOUS at 12:35

## 2017-01-01 RX ADMIN — METRONIDAZOLE 500 MG: 500 INJECTION, SOLUTION INTRAVENOUS at 09:06

## 2017-01-01 RX ADMIN — METRONIDAZOLE 500 MG: 500 TABLET ORAL at 22:45

## 2017-01-01 RX ADMIN — BIMATOPROST 1 DROP: 0.1 SOLUTION/ DROPS OPHTHALMIC at 21:40

## 2017-01-01 RX ADMIN — GUAIFENESIN 600 MG: 600 TABLET, EXTENDED RELEASE ORAL at 20:25

## 2017-01-01 RX ADMIN — Medication 300 UNITS: at 13:28

## 2017-01-01 RX ADMIN — DORZOLAMIDE HYDROCHLORIDE AND TIMOLOL MALEATE 1 DROP: 20; 5 SOLUTION/ DROPS OPHTHALMIC at 10:01

## 2017-01-01 RX ADMIN — FLUCONAZOLE 200 MG: 200 TABLET ORAL at 20:14

## 2017-01-01 RX ADMIN — CEFEPIME HYDROCHLORIDE 1000 MG: 1 INJECTION, POWDER, FOR SOLUTION INTRAMUSCULAR; INTRAVENOUS at 10:02

## 2017-01-01 RX ADMIN — LIDOCAINE 1 PATCH: 50 PATCH CUTANEOUS at 09:36

## 2017-01-01 RX ADMIN — HYDROCORTISONE SODIUM SUCCINATE 50 MG: 100 INJECTION, POWDER, FOR SOLUTION INTRAMUSCULAR; INTRAVENOUS at 17:31

## 2017-01-01 RX ADMIN — LIDOCAINE 1 PATCH: 50 PATCH CUTANEOUS at 08:30

## 2017-01-01 RX ADMIN — BIMATOPROST 1 DROP: 0.1 SOLUTION/ DROPS OPHTHALMIC at 21:42

## 2017-01-01 RX ADMIN — GUAIFENESIN 600 MG: 600 TABLET, EXTENDED RELEASE ORAL at 09:12

## 2017-01-01 RX ADMIN — BACITRACIN 1 SMALL APPLICATION: 500 OINTMENT TOPICAL at 18:05

## 2017-01-01 RX ADMIN — CEFEPIME HYDROCHLORIDE 1000 MG: 1 INJECTION, POWDER, FOR SOLUTION INTRAMUSCULAR; INTRAVENOUS at 08:48

## 2017-01-01 RX ADMIN — HYDROCODONE BITARTRATE AND ACETAMINOPHEN 1 TABLET: 5; 325 TABLET ORAL at 21:49

## 2017-01-01 RX ADMIN — DIPHENHYDRAMINE HYDROCHLORIDE 25 MG: 50 INJECTION, SOLUTION INTRAMUSCULAR; INTRAVENOUS at 12:58

## 2017-01-01 RX ADMIN — PREDNISONE 10 MG: 10 TABLET ORAL at 10:11

## 2017-01-01 RX ADMIN — BIMATOPROST 1 DROP: 0.1 SOLUTION/ DROPS OPHTHALMIC at 21:37

## 2017-01-01 RX ADMIN — METOCLOPRAMIDE 5 MG: 5 INJECTION, SOLUTION INTRAMUSCULAR; INTRAVENOUS at 00:18

## 2017-01-01 RX ADMIN — ACETAMINOPHEN 650 MG: 325 TABLET, FILM COATED ORAL at 15:51

## 2017-01-01 RX ADMIN — CEFEPIME HYDROCHLORIDE 1000 MG: 1 INJECTION, POWDER, FOR SOLUTION INTRAMUSCULAR; INTRAVENOUS at 21:36

## 2017-01-01 RX ADMIN — FLUCONAZOLE 200 MG: 200 TABLET ORAL at 22:45

## 2017-01-01 RX ADMIN — METRONIDAZOLE 500 MG: 500 INJECTION, SOLUTION INTRAVENOUS at 18:28

## 2017-01-01 RX ADMIN — Medication 300 UNITS: at 15:09

## 2017-01-01 RX ADMIN — GUAIFENESIN AND DEXTROMETHORPHAN 10 ML: 100; 10 SYRUP ORAL at 10:21

## 2017-01-01 RX ADMIN — METRONIDAZOLE 500 MG: 500 TABLET ORAL at 06:37

## 2017-01-01 RX ADMIN — BACITRACIN 1 SMALL APPLICATION: 500 OINTMENT TOPICAL at 10:00

## 2017-01-01 RX ADMIN — HYDROCODONE BITARTRATE AND ACETAMINOPHEN 1 TABLET: 5; 325 TABLET ORAL at 21:28

## 2017-01-01 RX ADMIN — GUAIFENESIN AND DEXTROMETHORPHAN 10 ML: 100; 10 SYRUP ORAL at 00:45

## 2017-01-01 RX ADMIN — GUAIFENESIN 600 MG: 600 TABLET, EXTENDED RELEASE ORAL at 21:28

## 2017-01-01 RX ADMIN — DORZOLAMIDE HYDROCHLORIDE AND TIMOLOL MALEATE 1 DROP: 20; 5 SOLUTION/ DROPS OPHTHALMIC at 09:00

## 2017-01-01 RX ADMIN — PREDNISONE 10 MG: 10 TABLET ORAL at 08:11

## 2017-01-01 RX ADMIN — GUAIFENESIN AND DEXTROMETHORPHAN 10 ML: 100; 10 SYRUP ORAL at 12:11

## 2017-01-01 RX ADMIN — TBO-FILGRASTIM 300 MCG: 300 INJECTION, SOLUTION SUBCUTANEOUS at 10:02

## 2017-01-01 RX ADMIN — BIMATOPROST 1 DROP: 0.1 SOLUTION/ DROPS OPHTHALMIC at 21:26

## 2017-01-01 RX ADMIN — SODIUM CHLORIDE 100 ML/HR: 0.9 INJECTION, SOLUTION INTRAVENOUS at 18:49

## 2017-01-01 RX ADMIN — BACITRACIN 1 SMALL APPLICATION: 500 OINTMENT TOPICAL at 18:37

## 2017-01-01 RX ADMIN — METOCLOPRAMIDE 5 MG: 5 INJECTION, SOLUTION INTRAMUSCULAR; INTRAVENOUS at 07:32

## 2017-01-01 RX ADMIN — FLUCONAZOLE 200 MG: 200 TABLET ORAL at 21:36

## 2017-01-01 RX ADMIN — LIDOCAINE HYDROCHLORIDE 10 ML: 20 SOLUTION ORAL; TOPICAL at 09:06

## 2017-01-01 RX ADMIN — HYDROCODONE BITARTRATE AND ACETAMINOPHEN 1 TABLET: 5; 325 TABLET ORAL at 20:39

## 2017-01-01 RX ADMIN — ONDANSETRON 4 MG: 2 INJECTION INTRAMUSCULAR; INTRAVENOUS at 17:25

## 2017-01-01 RX ADMIN — METRONIDAZOLE 500 MG: 500 TABLET ORAL at 13:43

## 2017-01-01 RX ADMIN — CEFEPIME HYDROCHLORIDE 1000 MG: 1 INJECTION, POWDER, FOR SOLUTION INTRAMUSCULAR; INTRAVENOUS at 18:16

## 2017-01-01 RX ADMIN — LIDOCAINE HYDROCHLORIDE 10 ML: 20 SOLUTION ORAL; TOPICAL at 14:42

## 2017-01-01 RX ADMIN — CEFEPIME HYDROCHLORIDE 1000 MG: 1 INJECTION, POWDER, FOR SOLUTION INTRAMUSCULAR; INTRAVENOUS at 18:19

## 2017-01-01 RX ADMIN — LIDOCAINE 1 PATCH: 50 PATCH CUTANEOUS at 08:11

## 2017-01-01 RX ADMIN — HYDROCODONE BITARTRATE AND ACETAMINOPHEN 1 TABLET: 5; 325 TABLET ORAL at 21:33

## 2017-01-01 RX ADMIN — PREDNISONE 10 MG: 10 TABLET ORAL at 18:00

## 2017-01-01 RX ADMIN — HYDROCODONE BITARTRATE AND ACETAMINOPHEN 1 TABLET: 5; 325 TABLET ORAL at 17:57

## 2017-01-01 RX ADMIN — SODIUM CHLORIDE 20 ML/HR: 0.9 INJECTION, SOLUTION INTRAVENOUS at 13:38

## 2017-01-01 RX ADMIN — ONDANSETRON 4 MG: 2 INJECTION INTRAMUSCULAR; INTRAVENOUS at 08:21

## 2017-01-01 RX ADMIN — DORZOLAMIDE HYDROCHLORIDE AND TIMOLOL MALEATE 1 DROP: 20; 5 SOLUTION/ DROPS OPHTHALMIC at 08:24

## 2017-01-01 RX ADMIN — SODIUM CHLORIDE 20 ML/HR: 0.9 INJECTION, SOLUTION INTRAVENOUS at 13:37

## 2017-01-01 RX ADMIN — Medication 300 UNITS: at 14:25

## 2017-01-01 RX ADMIN — CEFEPIME HYDROCHLORIDE 1000 MG: 1 INJECTION, POWDER, FOR SOLUTION INTRAMUSCULAR; INTRAVENOUS at 01:15

## 2017-01-01 RX ADMIN — Medication 300 UNITS: at 16:47

## 2017-01-01 RX ADMIN — HYDROCODONE BITARTRATE AND ACETAMINOPHEN 1 TABLET: 5; 325 TABLET ORAL at 06:37

## 2017-01-01 RX ADMIN — SODIUM CHLORIDE 20 ML/HR: 0.9 INJECTION, SOLUTION INTRAVENOUS at 14:40

## 2017-01-01 RX ADMIN — BIMATOPROST 1 DROP: 0.1 SOLUTION/ DROPS OPHTHALMIC at 22:23

## 2017-01-01 RX ADMIN — PREDNISONE 10 MG: 10 TABLET ORAL at 09:36

## 2017-01-01 RX ADMIN — METRONIDAZOLE 500 MG: 500 TABLET ORAL at 21:28

## 2017-01-01 RX ADMIN — HYDROCODONE BITARTRATE AND ACETAMINOPHEN 1 TABLET: 5; 325 TABLET ORAL at 09:59

## 2017-01-01 RX ADMIN — GUAIFENESIN 600 MG: 600 TABLET, EXTENDED RELEASE ORAL at 20:39

## 2017-01-01 RX ADMIN — BIMATOPROST 1 DROP: 0.1 SOLUTION/ DROPS OPHTHALMIC at 22:46

## 2017-01-01 RX ADMIN — HYDROCODONE BITARTRATE AND ACETAMINOPHEN 1 TABLET: 5; 325 TABLET ORAL at 23:40

## 2017-01-01 RX ADMIN — SODIUM CHLORIDE 20 ML/HR: 0.9 INJECTION, SOLUTION INTRAVENOUS at 13:28

## 2017-01-01 RX ADMIN — PREDNISONE 10 MG: 10 TABLET ORAL at 20:39

## 2017-01-01 RX ADMIN — HYDROCODONE BITARTRATE AND ACETAMINOPHEN 1 TABLET: 5; 325 TABLET ORAL at 09:36

## 2017-01-01 RX ADMIN — Medication 300 UNITS: at 16:49

## 2017-01-01 RX ADMIN — HYDROCODONE BITARTRATE AND ACETAMINOPHEN 1 TABLET: 5; 325 TABLET ORAL at 00:31

## 2017-01-01 RX ADMIN — HYDROCODONE BITARTRATE AND ACETAMINOPHEN 1 TABLET: 5; 325 TABLET ORAL at 18:17

## 2017-01-01 RX ADMIN — METRONIDAZOLE 500 MG: 500 INJECTION, SOLUTION INTRAVENOUS at 03:53

## 2017-01-01 RX ADMIN — TBO-FILGRASTIM 252 MCG: 300 INJECTION, SOLUTION SUBCUTANEOUS at 18:35

## 2017-01-01 RX ADMIN — LIDOCAINE HYDROCHLORIDE 10 ML: 20 SOLUTION ORAL; TOPICAL at 12:56

## 2017-01-01 RX ADMIN — DORZOLAMIDE HYDROCHLORIDE AND TIMOLOL MALEATE 1 DROP: 20; 5 SOLUTION/ DROPS OPHTHALMIC at 20:32

## 2017-01-01 RX ADMIN — METRONIDAZOLE 500 MG: 500 TABLET ORAL at 06:29

## 2017-01-01 RX ADMIN — GUAIFENESIN 600 MG: 600 TABLET, EXTENDED RELEASE ORAL at 21:01

## 2017-01-01 RX ADMIN — DECITABINE 30 MG: 50 INJECTION, POWDER, LYOPHILIZED, FOR SOLUTION INTRAVENOUS at 14:39

## 2017-01-01 RX ADMIN — LIDOCAINE HYDROCHLORIDE 10 ML: 20 SOLUTION ORAL; TOPICAL at 13:05

## 2017-01-01 RX ADMIN — HYDROCODONE BITARTRATE AND ACETAMINOPHEN 1 TABLET: 5; 325 TABLET ORAL at 01:48

## 2017-01-01 RX ADMIN — BACITRACIN 1 SMALL APPLICATION: 500 OINTMENT TOPICAL at 09:03

## 2017-01-01 RX ADMIN — BIMATOPROST 1 DROP: 0.1 SOLUTION/ DROPS OPHTHALMIC at 21:59

## 2017-01-01 RX ADMIN — Medication 300 UNITS: at 15:41

## 2017-01-01 RX ADMIN — DIPHENHYDRAMINE HCL 25 MG: 25 TABLET ORAL at 11:08

## 2017-01-01 RX ADMIN — CEFEPIME HYDROCHLORIDE 1000 MG: 1 INJECTION, POWDER, FOR SOLUTION INTRAMUSCULAR; INTRAVENOUS at 18:42

## 2017-01-01 RX ADMIN — DORZOLAMIDE HYDROCHLORIDE AND TIMOLOL MALEATE 1 DROP: 20; 5 SOLUTION/ DROPS OPHTHALMIC at 21:33

## 2017-01-01 RX ADMIN — CEFEPIME HYDROCHLORIDE 1000 MG: 1 INJECTION, POWDER, FOR SOLUTION INTRAMUSCULAR; INTRAVENOUS at 20:14

## 2017-01-01 RX ADMIN — HYDROCODONE BITARTRATE AND ACETAMINOPHEN 1 TABLET: 5; 325 TABLET ORAL at 16:05

## 2017-01-01 RX ADMIN — DORZOLAMIDE HYDROCHLORIDE AND TIMOLOL MALEATE 1 DROP: 20; 5 SOLUTION/ DROPS OPHTHALMIC at 20:15

## 2017-01-01 RX ADMIN — TBO-FILGRASTIM 252 MCG: 300 INJECTION, SOLUTION SUBCUTANEOUS at 20:36

## 2017-01-01 RX ADMIN — DECITABINE 30 MG: 50 INJECTION, POWDER, LYOPHILIZED, FOR SOLUTION INTRAVENOUS at 14:02

## 2017-01-01 RX ADMIN — HYDROCODONE BITARTRATE AND ACETAMINOPHEN 1 TABLET: 5; 325 TABLET ORAL at 22:46

## 2017-01-01 RX ADMIN — HYDROCODONE BITARTRATE AND ACETAMINOPHEN 1 TABLET: 5; 325 TABLET ORAL at 10:02

## 2017-01-01 RX ADMIN — FLUCONAZOLE 200 MG: 200 TABLET ORAL at 20:53

## 2017-01-01 RX ADMIN — BIMATOPROST 1 DROP: 0.1 SOLUTION/ DROPS OPHTHALMIC at 21:04

## 2017-01-01 RX ADMIN — Medication 300 UNITS: at 14:34

## 2017-01-01 RX ADMIN — Medication 300 UNITS: at 14:30

## 2017-01-01 RX ADMIN — HYDROCODONE BITARTRATE AND ACETAMINOPHEN 1 TABLET: 5; 325 TABLET ORAL at 20:53

## 2017-01-01 RX ADMIN — HYDROCODONE BITARTRATE AND ACETAMINOPHEN 1 TABLET: 5; 325 TABLET ORAL at 14:54

## 2017-01-01 RX ADMIN — AMLODIPINE BESYLATE 2.5 MG: 2.5 TABLET ORAL at 09:52

## 2017-01-01 RX ADMIN — PREDNISONE 10 MG: 10 TABLET ORAL at 18:03

## 2017-01-01 RX ADMIN — METRONIDAZOLE 500 MG: 500 INJECTION, SOLUTION INTRAVENOUS at 15:43

## 2017-01-09 NOTE — PROGRESS NOTES
PT recently discharged from the hospital   She was sent home on O2, uses condenser at night  When she wakes up after sleeping with the O2 she feels dizzy, nauseated and just not well in general   When she sleeps without it she feels fine  She plans to try the tank for a night and see how she feels  Instructed her to call home delivery company if she notices a difference with the tank  Pt also has a small lump on her left lower shin  Tender to the touch, dark bruised area in the middle  Pt sees Dr Chun Blum on Thursday  Instructed her to call Dr Marylene Durand office if it worsens in any way before THursdays jake Gasca RN made aware of all the above and agreed

## 2017-01-09 NOTE — PLAN OF CARE
Problem: Potential for Falls  Goal: Patient will remain free of falls  INTERVENTIONS:  - Assess patient frequently for physical needs  - Identify cognitive and physical deficits and behaviors that affect risk of falls    - [place] fall precautions as indicated by assessment   - Educate patient/family on patient safety including physical limitations  - Instruct patient to call for assistance with activity based on assessment  - Modify environment to reduce risk of injury  - Consider OT/PT consult to assist with strengthening/mobility   Outcome: Progressing

## 2017-01-09 NOTE — PROGRESS NOTES
Pt here for port flush and labs for possible transfusion  Results reviewed with pt, no transfusions needed  She will see Dr Karina Esquivel on Thursday and chemo next week

## 2017-01-17 NOTE — PLAN OF CARE
Problem: Potential for Falls  Goal: Patient will remain free of falls  INTERVENTIONS:  - Assess patient frequently for physical needs  - Identify cognitive and physical deficits and behaviors that affect risk of falls    - [place] fall precautions as indicated by assessment   - Educate patient/family on patient safety including physical limitations  - Instruct patient to call for assistance with activity based on assessment  - Modify environment to reduce risk of injury  - Consider OT/PT consult to assist with strengthening/mobility   Outcome: Progressing

## 2017-01-18 NOTE — PLAN OF CARE
Problem: Potential for Falls  Goal: Patient will remain free of falls  INTERVENTIONS:  - Assess patient frequently for physical needs  - Identify cognitive and physical deficits and behaviors that affect risk of falls    - [place] fall precautions as indicated by assessment   - Educate patient/family on patient safety including physical limitations  - Instruct patient to call for assistance with activity based on assessment  - Modify environment to reduce risk of injury  - Consider OT/PT consult to assist with strengthening/mobility   Outcome: Progressing

## 2017-01-19 NOTE — PLAN OF CARE
Problem: Potential for Falls  Goal: Patient will remain free of falls  INTERVENTIONS:  - Assess patient frequently for physical needs  - Identify cognitive and physical deficits and behaviors that affect risk of falls    - [place] fall precautions as indicated by assessment   - Educate patient/family on patient safety including physical limitations  - Instruct patient to call for assistance with activity based on assessment  - Modify environment to reduce risk of injury  - Consider OT/PT consult to assist with strengthening/mobility   Outcome: Progressing

## 2017-01-19 NOTE — PLAN OF CARE
Problem: Potential for Falls  Goal: Patient will remain free of falls  INTERVENTIONS:  - Assess patient frequently for physical needs  - Identify cognitive and physical deficits and behaviors that affect risk of falls    - [place] fall precautions as indicated by assessment   - Educate patient/family on patient safety including physical limitations  - Instruct patient to call for assistance with activity based on assessment  - Modify environment to reduce risk of injury  - Consider OT/PT consult to assist with strengthening/mobility   Outcome: Progressing

## 2017-01-23 NOTE — PROGRESS NOTES
Pt  Offers no complaints,  Here for central labs / Pac flush  Pt  Waiting for the results  Pac flushed without incident

## 2017-01-23 NOTE — PLAN OF CARE
Problem: Potential for Falls  Goal: Patient will remain free of falls  INTERVENTIONS:  - Assess patient frequently for physical needs  - Identify cognitive and physical deficits and behaviors that affect risk of falls    - [place] fall precautions as indicated by assessment   - Educate patient/family on patient safety including physical limitations  - Instruct patient to call for assistance with activity based on assessment  - Modify environment to reduce risk of injury  - Consider OT/PT consult to assist with strengthening/mobility   Outcome: Progressing

## 2017-01-30 NOTE — PLAN OF CARE
Problem: Potential for Falls  Goal: Patient will remain free of falls  INTERVENTIONS:  - Assess patient frequently for physical needs  - Identify cognitive and physical deficits and behaviors that affect risk of falls    - [place] fall precautions as indicated by assessment   - Educate patient/family on patient safety including physical limitations  - Instruct patient to call for assistance with activity based on assessment  - Modify environment to reduce risk of injury  - Consider OT/PT consult to assist with strengthening/mobility   Outcome: Progressing

## 2017-02-02 NOTE — PLAN OF CARE
Problem: Potential for Falls  Goal: Patient will remain free of falls  INTERVENTIONS:  - Assess patient frequently for physical needs  - Identify cognitive and physical deficits and behaviors that affect risk of falls    - [place] fall precautions as indicated by assessment   - Educate patient/family on patient safety including physical limitations  - Instruct patient to call for assistance with activity based on assessment  - Modify environment to reduce risk of injury  - Consider OT/PT consult to assist with strengthening/mobility   Outcome: Progressing

## 2017-02-03 NOTE — PLAN OF CARE
Problem: Potential for Falls  Goal: Patient will remain free of falls  INTERVENTIONS:  - Assess patient frequently for physical needs  - Identify cognitive and physical deficits and behaviors that affect risk of falls    - [place] fall precautions as indicated by assessment   - Educate patient/family on patient safety including physical limitations  - Instruct patient to call for assistance with activity based on assessment  - Modify environment to reduce risk of injury  - Consider OT/PT consult to assist with strengthening/mobility   Outcome: Progressing

## 2017-02-13 NOTE — PLAN OF CARE
Problem: Potential for Falls  Goal: Patient will remain free of falls  INTERVENTIONS:  - Assess patient frequently for physical needs  - Identify cognitive and physical deficits and behaviors that affect risk of falls    - [place] fall precautions as indicated by assessment   - Educate patient/family on patient safety including physical limitations  - Instruct patient to call for assistance with activity based on assessment  - Modify environment to reduce risk of injury  - Consider OT/PT consult to assist with strengthening/mobility   Outcome: Progressing

## 2017-02-20 NOTE — PLAN OF CARE
Problem: Potential for Falls  Goal: Patient will remain free of falls  INTERVENTIONS:  - Assess patient frequently for physical needs  - Identify cognitive and physical deficits and behaviors that affect risk of falls    - [place] fall precautions as indicated by assessment   - Educate patient/family on patient safety including physical limitations  - Instruct patient to call for assistance with activity based on assessment  - Modify environment to reduce risk of injury  - Consider OT/PT consult to assist with strengthening/mobility   Outcome: Progressing

## 2017-02-27 NOTE — PLAN OF CARE
Problem: Potential for Falls  Goal: Patient will remain free of falls  INTERVENTIONS:  - Assess patient frequently for physical needs  - Identify cognitive and physical deficits and behaviors that affect risk of falls    - [place] fall precautions as indicated by assessment   - Educate patient/family on patient safety including physical limitations  - Instruct patient to call for assistance with activity based on assessment  - Modify environment to reduce risk of injury  - Consider OT/PT consult to assist with strengthening/mobility   Outcome: Progressing

## 2017-02-28 NOTE — PLAN OF CARE
Problem: Potential for Falls  Goal: Patient will remain free of falls  INTERVENTIONS:  - Assess patient frequently for physical needs  - Identify cognitive and physical deficits and behaviors that affect risk of falls    - [place] fall precautions as indicated by assessment   - Educate patient/family on patient safety including physical limitations  - Instruct patient to call for assistance with activity based on assessment  - Modify environment to reduce risk of injury  - Consider OT/PT consult to assist with strengthening/mobility   Outcome: Progressing

## 2017-03-01 NOTE — PLAN OF CARE
Problem: Potential for Falls  Goal: Patient will remain free of falls  INTERVENTIONS:  - Assess patient frequently for physical needs  - Identify cognitive and physical deficits and behaviors that affect risk of falls    - [place] fall precautions as indicated by assessment   - Educate patient/family on patient safety including physical limitations  - Instruct patient to call for assistance with activity based on assessment  - Modify environment to reduce risk of injury  - Consider OT/PT consult to assist with strengthening/mobility   Outcome: Progressing

## 2017-03-01 NOTE — PLAN OF CARE
Problem: Potential for Falls  Goal: Patient will remain free of falls  INTERVENTIONS:  - Assess patient frequently for physical needs  - Identify cognitive and physical deficits and behaviors that affect risk of falls    - [place] fall precautions as indicated by assessment   - Educate patient/family on patient safety including physical limitations  - Instruct patient to call for assistance with activity based on assessment  - Modify environment to reduce risk of injury  - Consider OT/PT consult to assist with strengthening/mobility   Outcome: Progressing

## 2017-03-02 NOTE — PLAN OF CARE
Problem: Potential for Falls  Goal: Patient will remain free of falls  INTERVENTIONS:  - Assess patient frequently for physical needs  - Identify cognitive and physical deficits and behaviors that affect risk of falls    - [place] fall precautions as indicated by assessment   - Educate patient/family on patient safety including physical limitations  - Instruct patient to call for assistance with activity based on assessment  - Modify environment to reduce risk of injury  - Consider OT/PT consult to assist with strengthening/mobility   Outcome: Progressing

## 2017-03-17 NOTE — PROGRESS NOTES
Pt here for 2 units prbc today, refusing platelet transfusion today, pt states "Im feeling sob and dizzy", vitals stable, will transfuse 2 units and reasess symptoms

## 2017-03-17 NOTE — PLAN OF CARE
Problem: Potential for Falls  Goal: Patient will remain free of falls  INTERVENTIONS:  - Assess patient frequently for physical needs  - Identify cognitive and physical deficits and behaviors that affect risk of falls    - [place] fall precautions as indicated by assessment   - Educate patient/family on patient safety including physical limitations  - Instruct patient to call for assistance with activity based on assessment  - Modify environment to reduce risk of injury  - Consider OT/PT consult to assist with strengthening/mobility   Outcome: Progressing

## 2017-03-20 NOTE — PROGRESS NOTES
Pt had large skin tear on right forearm, pt was seen in [place] [address] and given bactrim- Dsg changed today

## 2017-04-04 NOTE — PROGRESS NOTES
Pt offers no complaints,labs drawn as ordered via port and port flushed per protocol  Pt aware of next appointment as scheduled  Pt declined AVS from 4/4/17    Discharged from Infusion center to home with caregiver

## 2017-04-07 NOTE — PLAN OF CARE
Problem: Potential for Falls  Goal: Patient will remain free of falls  INTERVENTIONS:  - Assess patient frequently for physical needs  - Identify cognitive and physical deficits and behaviors that affect risk of falls    - [place] fall precautions as indicated by assessment   - Educate patient/family on patient safety including physical limitations  - Instruct patient to call for assistance with activity based on assessment  - Modify environment to reduce risk of injury  - Consider OT/PT consult to assist with strengthening/mobility   Outcome: Progressing

## 2017-04-08 NOTE — PLAN OF CARE
Problem: Potential for Falls  Goal: Patient will remain free of falls  INTERVENTIONS:  - Assess patient frequently for physical needs  - Identify cognitive and physical deficits and behaviors that affect risk of falls    - [place] fall precautions as indicated by assessment   - Educate patient/family on patient safety including physical limitations  - Instruct patient to call for assistance with activity based on assessment  - Modify environment to reduce risk of injury  - Consider OT/PT consult to assist with strengthening/mobility   Outcome: Progressing

## 2017-04-10 NOTE — PLAN OF CARE
Problem: Potential for Falls  Goal: Patient will remain free of falls  INTERVENTIONS:  - Assess patient frequently for physical needs  - Identify cognitive and physical deficits and behaviors that affect risk of falls    - [place] fall precautions as indicated by assessment   - Educate patient/family on patient safety including physical limitations  - Instruct patient to call for assistance with activity based on assessment  - Modify environment to reduce risk of injury  - Consider OT/PT consult to assist with strengthening/mobility   Outcome: Progressing

## 2017-04-11 NOTE — PLAN OF CARE
Problem: Potential for Falls  Goal: Patient will remain free of falls  INTERVENTIONS:  - Assess patient frequently for physical needs  - Identify cognitive and physical deficits and behaviors that affect risk of falls    - [place] fall precautions as indicated by assessment   - Educate patient/family on patient safety including physical limitations  - Instruct patient to call for assistance with activity based on assessment  - Modify environment to reduce risk of injury  - Consider OT/PT consult to assist with strengthening/mobility   Outcome: Progressing

## 2017-04-12 NOTE — PLAN OF CARE
Problem: Potential for Falls  Goal: Patient will remain free of falls  INTERVENTIONS:  - Assess patient frequently for physical needs  - Identify cognitive and physical deficits and behaviors that affect risk of falls    - [place] fall precautions as indicated by assessment   - Educate patient/family on patient safety including physical limitations  - Instruct patient to call for assistance with activity based on assessment  - Modify environment to reduce risk of injury  - Consider OT/PT consult to assist with strengthening/mobility   Outcome: Progressing

## 2017-04-12 NOTE — PROGRESS NOTES
Left chest wall pac remained accessed for dacogen  On Thursday  Site intact no redness noted  Flushed with ease  Positive blood return    Dressing intact

## 2017-04-13 NOTE — PLAN OF CARE
Problem: Potential for Falls  Goal: Patient will remain free of falls  INTERVENTIONS:  - Assess patient frequently for physical needs  - Identify cognitive and physical deficits and behaviors that affect risk of falls    - [place] fall precautions as indicated by assessment   - Educate patient/family on patient safety including physical limitations  - Instruct patient to call for assistance with activity based on assessment  - Modify environment to reduce risk of injury  - Consider OT/PT consult to assist with strengthening/mobility   Outcome: Progressing

## 2017-04-17 NOTE — MR AVS SNAPSHOT
Adela Susie   4/17/2017  2:00 PM   [facility]    Provider:  PARTH OCONNELL CHAIR   Department:  [address] [place] [address]   Dept Phone:  [phone]                Your Full Care Plan              Today's Medication Changes      Notice    This visit has been closed  A record of the med list at the time of the visit is not available  Your Updated Medication List     Notice    This visit has been closed  A record of the med list at the time of the visit is not available  [employer] Signup    [employer] allows you to send messages to your doctor, view your test results, renew your prescriptions, schedule appointments, and more  To sign up, go to [email or web address]  [email or web address] [employer] REAL URL  com and click on the Sign Up Now link in the New User? box  Enter your [employer] Activation Code exactly as it appears below along with the last four digits of your Social Security Number and your Date of Birth to complete the sign-up process  If you do not sign up before the expiration date, you must request a new code  [employer] Activation Code: [email or web address]  Expires: 10/23/2017  6:05 PM    If you have [email or web address] or call [phone] to talk to our [address] staff  Remember, [employer] is NOT to be used for urgent needs  For medical emergencies, dial 911  Other Info from Your Visit           Allergies     Demerol [meperidine]     Gabapentin     Lyrica [pregabalin]     Methocarbamol       Reason for Visit     Port flush and labs       Vital Signs     Smoking Status   Never Smoker             Medications Administered     heparin lock flush 100 units/mL injection 300 Units      Results     CBC and differential      Component Value Standard Range & Units    WBC 1 72 4 31 - 10 16 Thousand/uL    This result has been called to ANANDA STANTON by Brittani Man on 04 17 2017 at 1506, and has been read back       RBC 2 88 3 81 - 5 12 Million/uL    Hemoglobin 9 2 11 5 - 15 4 g/dL    Hematocrit 28 2 34 8 - 46 1 %    MCV 98 82 - 98 fL    MCH 31 9 26 8 - 34 3 pg    MCHC 32 6 31 4 - 37 4 g/dL    RDW 17 6 11 6 - 15 1 %    MPV 10 9 8 9 - 12 7 fL    Platelets 20 003 - 780 Thousands/uL    This result has been called to ANANDA STANTON by Laura Chen on 04 17 2017 at 1506, and has been read back       nRBC 2 /100 WBCs    Neutrophils Relative 68 43 - 75 %    Lymphocytes Relative 25 14 - 44 %    Monocytes Relative 5 4 - 12 %    Eosinophils Relative 1 0 - 6 %    Basophils Relative 1 0 - 1 %    Neutrophils Absolute 1 17 1 85 - 7 62 Thousands/µL    Lymphocytes Absolute 0 43 0 60 - 4 47 Thousands/µL    Monocytes Absolute 0 09 0 17 - 1 22 Thousand/µL    Eosinophils Absolute 0 01 0 00 - 0 61 Thousand/µL    Basophils Absolute 0 01 0 00 - 0 10 Thousands/µL

## 2017-04-17 NOTE — PLAN OF CARE
Problem: Potential for Falls  Goal: Patient will remain free of falls  INTERVENTIONS:  - Assess patient frequently for physical needs  - Identify cognitive and physical deficits and behaviors that affect risk of falls    - [place] fall precautions as indicated by assessment   - Educate patient/family on patient safety including physical limitations  - Instruct patient to call for assistance with activity based on assessment  - Modify environment to reduce risk of injury  - Consider OT/PT consult to assist with strengthening/mobility   Outcome: Progressing

## 2017-04-18 NOTE — PLAN OF CARE
Problem: Potential for Falls  Goal: Patient will remain free of falls  INTERVENTIONS:  - Assess patient frequently for physical needs  - Identify cognitive and physical deficits and behaviors that affect risk of falls    - [place] fall precautions as indicated by assessment   - Educate patient/family on patient safety including physical limitations  - Instruct patient to call for assistance with activity based on assessment  - Modify environment to reduce risk of injury  - Consider OT/PT consult to assist with strengthening/mobility   Outcome: Progressing

## 2017-04-24 NOTE — PLAN OF CARE
Problem: Potential for Falls  Goal: Patient will remain free of falls  INTERVENTIONS:  - Assess patient frequently for physical needs  - Identify cognitive and physical deficits and behaviors that affect risk of falls    - [place] fall precautions as indicated by assessment   - Educate patient/family on patient safety including physical limitations  - Instruct patient to call for assistance with activity based on assessment  - Modify environment to reduce risk of injury  - Consider OT/PT consult to assist with strengthening/mobility   Outcome: Progressing

## 2017-04-24 NOTE — PROGRESS NOTES
Spoke with Elo/Dr Piper Narvaez  Aware of the 7 7 hemoglobin and Elo will call patient  Elo aware of new standing order needed for chart

## 2017-04-25 PROBLEM — J18.9 PNEUMONIA: Status: ACTIVE | Noted: 2017-01-01

## 2017-04-25 PROBLEM — K52.9 COLITIS: Status: ACTIVE | Noted: 2017-01-01

## 2017-04-25 PROBLEM — R10.9 ABDOMINAL PAIN: Status: ACTIVE | Noted: 2017-01-01

## 2017-04-25 PROBLEM — R19.7 DIARRHEA: Status: ACTIVE | Noted: 2017-01-01

## 2017-05-03 PROBLEM — B37.0 THRUSH, ORAL: Status: ACTIVE | Noted: 2017-01-01

## 2017-05-03 PROBLEM — D46.20 MYELODYSPLASTIC SYNDROME, LOW GRADE (HCC): Status: ACTIVE | Noted: 2017-01-01

## 2017-05-03 PROBLEM — E46 MALNUTRITION (HCC): Status: ACTIVE | Noted: 2017-01-01

## 2017-05-03 PROBLEM — K52.9 COLITIS: Status: RESOLVED | Noted: 2017-01-01 | Resolved: 2017-01-01

## 2018-01-09 NOTE — MISCELLANEOUS
Message   Recorded as Task   Date: 11/02/2016 02:42 PM, Created By: Janna Durham   Task Name: Follow Up   Assigned To: Elo Guzman   Regarding Patient: Deann Villalobos, Status: Active   CommentHulen Larve - 02 Nov 2016 2:42 PM     TASK CREATED  Caller: Self; General Medical Question; [phone] (Home)  Patient getting her flu shot and wondering if she should get the regular or senior  [phone]   Patient will get regular flu shot on Wednesday      Active Problems    1  Acute cervical radiculopathy (723 4) (M54 12)   2  Anemia (285 9) (D64 9)   3  Aortic regurgitation (424 1) (I35 1)   4  Aortic stenosis (424 1) (I35 0)   5  Benign essential HTN (401 1) (I10)   6  Bone pain (733 90) (M89 8X9)   7  Cervical radiculopathy (723 4) (M54 12)   8  Cervical stenosis of spine (723 0) (M48 02)   9  Cervicalgia (723 1) (M54 2)   10  Iron overload (275 09) (E83 19)   11  Lumbar degenerative disc disease (722 52) (M51 36)   12  Lumbar herniated disc (722 10) (M51 26)   13  Lumbar spinal stenosis (724 02) (M48 06)   14  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)   15  Mitral stenosis (394 0) (I05 0)   16  Muscle pain, myofascial (729 1) (M79 1)   17  Myelodysplastic syndrome (238 75) (D46 9)   18  Nausea (787 02) (R11 0)   19  Osteoarthritis of left shoulder (715 91) (M19 012)   20  Osteoarthritis of right shoulder (715 91) (M19 011)   21  Pancytopenia (284 19) (D61 818)   22  Primary localized osteoarthrosis of right shoulder (715 11) (M19 011)   23  Right shoulder pain (719 41) (M25 511)   24  Sjogren's syndrome (710 2) (M35 00)    Current Meds   1  AmLODIPine Besylate 5 MG Oral Tablet; 0 5 tab qhs Recorded   2  AmLODIPine Besylate 5 MG Oral Tablet; Take 1 tablet daily; Therapy: (Recorded:46Izj5503) to Recorded   3  Calcium 500 + D TABS; Therapy: (JDEAUGPK:09VKQ7333) to Recorded   4  Compazine 5 MG TABS (Prochlorperazine Maleate); Therapy: (Recorded:07Szf0488) to Recorded   5   Cranberry TABS; Therapy: (Recorded:31Mar2015) to Recorded   6  Daily Multiple Vitamins TABS; Therapy: (Recorded:49Bjo2699) to Recorded   7  Doxycycline Hyclate 20 MG Oral Tablet; TAKE 1 TABLET TWICE DAILY; Therapy: (Recorded:20Nwc1129) to Recorded   8  Hydrocodone-Acetaminophen 5-325 MG Oral Tablet; TAKE 1 TABLET Every 4 hours   PRN; Therapy: 03NGH1178 to (Evaluate:30Nov2016); Last Rx:31Oct2016 Ordered   9  Loperamide HCl 2 MG TABS; Take 1 tablet daily; Therapy: (Recorded:42Lnq0081) to Recorded   10  Metanx 3-90 314-2-35 MG Oral Capsule; take 1 capsule daily; Therapy: 16XYY3020 to (Evaluate:06Jul2017); Last Rx:72Zki1530 Ordered   11  Nitroglycerin 0 4 MG Sublingual Tablet Sublingual; TAKE AS DIRECTED; Therapy: (Recorded:53Ekz8236) to Recorded   12  PredniSONE 10 MG Oral Tablet; TAKE 1 TABLET DAILY; Therapy: (Recorded:31Pso0113) to Recorded   13  PredniSONE 5 MG Oral Tablet; Take 1 tablet twice daily; Therapy: (Recorded:46Xsi4260) to Recorded   14  Prochlorperazine Maleate 5 MG Oral Tablet; TAKE 1 TABLET 3 TIMES DAILY; Therapy: (Recorded:50Nda9040) to Recorded   15  Promethazine HCl - 12 5 MG Oral Tablet; TAKE 1 TABLET 3 times daily; Therapy: (Recorded:59Iyu2716) to Recorded   16  Vitamin B Complex CAPS; Therapy: (Ajit Langford) to Recorded   17  Vitamin D 1000 UNIT CAPS; Therapy: (AWKSXIWA:10DAC4729) to Recorded    Allergies    1   Robaxin TABS    Signatures   Electronically signed by : Mitchell Cooks, ; Nov 2 2016  3:18PM EST                       (Author)

## 2018-01-10 NOTE — PROGRESS NOTES
Assessment    1  Myelodysplastic syndrome (238 75) (D46 9)    Plan  Myelodysplastic syndrome    · Drink plenty of fluids ; Status:Complete;   Done: 71SMO2540   Ordered; For:Myelodysplastic syndrome; Ordered By:Eliezer Ware;   · Follow-up visit in 6 weeks Evaluation and Treatment  Follow-up  Status: Hold For -  Scheduling  Requested for: 27LKI0700   Ordered; For: Myelodysplastic syndrome; Ordered By: Laxmi Murillo Performed:  Due: 20OBR1432    Discussion/Summary  Discussion Summary:   In summary, this is a 25-year-old female history of myelodysplasia as outlined  She was recently admitted for febrile neutropenia  She improved with antibiotics and supportive care  Additionally, she tells me that she has a small lesion on the left pretibial area  This measures about 7 mm in size with small exit date in the center  It is not particularly tender or erythematous  This could represent a limited squamous cell carcinoma of the skin or infectious process  Observation is appropriate in the short run  During hospitalization some of the physicians advocated shortening  Her Dacogen therapy from 5 days to 4 days  This was probably suggested in an effort to minimize future  Neutropenia  It is noteworthy that her neutropenia is much better since she's been on Dacogen than prior to initiation of therapy at which time it was fairly constant  I reviewed above considerations with the patient  She voiced understanding and agreement  Counseling Documentation With Imm: The patient was counseled regarding diagnostic results, instructions for management, patient and family education, impressions  total time of encounter was 25 minutes  Chief Complaint  Chief Complaint Free Text Note Form: evaluation regarding MDS  History of Present Illness  Previous Therapy:   Initially diagnosed with Silvano syndrome in about 2000  She was found to have myelodysplasia in September 2011  (-20 Q ), IPSS score, intermediate-one    Dacogen started November 2011 on a every 4 week schedule  This was changed to every 6 week schedule in February 2012  Current Therapy: Dacogen 20 mg per meter squared daily x5, every 6 weeks  Interval History: Admitted to hospital for febrile neutropenia  Some night time hypoxia  DC'd on O2 at night  Had nausea and dizziness at night but this resolved when she didn't use it  Rechallenged with recurrence of symptoms  Has been on Methadone for brief while  Pain generally well controlled  Tolerating ok  Review of Systems  Complete-Female:   Constitutional: No fever, no chills, feels well, no tiredness, no recent weight gain or weight loss, no fever and no chills  Eyes: No complaints of eye pain, no red eyes, no eyesight problems, no discharge, no dry eyes, no itching of eyes  ENT: no complaints of earache, no loss of hearing, no nose bleeds, no nasal discharge, no sore throat, no hoarseness  Cardiovascular: No complaints of slow heart rate, no fast heart rate, no chest pain, no palpitations, no leg claudication, no lower extremity edema  Respiratory: No complaints of shortness of breath, no wheezing, no cough, no SOB on exertion, no orthopnea, no PND  Gastrointestinal: no diarrhea    The patient presents with complaints of no constipation (controlled with magnesium )  Genitourinary: No complaints of dysuria, no incontinence, no pelvic pain, no dysmenorrhea, no vaginal discharge or bleeding  Musculoskeletal: lot of right shoulder pain, hx of rotator cuff  , but as noted in HPI  Integumentary: No complaints of skin rash or lesions, no itching, no skin wounds, no breast pain or lump  Neurological: No complaints of headache, no confusion, no convulsions, no numbness, no dizziness or fainting, no tingling, no limb weakness, no difficulty walking  Psychiatric: Not suicidal, no sleep disturbance, no anxiety or depression, no change in personality, no emotional problems     Endocrine: No complaints of proptosis, no hot flashes, no muscle weakness, no deepening of the voice, no feelings of weakness  Hematologic/Lymphatic: No complaints of swollen glands, no swollen glands in the neck, does not bleed easily, does not bruise easily  Active Problems    1  Acute cervical radiculopathy (723 4) (M54 12)   2  Advance directive discussed with patient (V65 49) (Z71 89)   3  Anemia (285 9) (D64 9)   4  Aortic regurgitation (424 1) (I35 1)   5  Aortic stenosis (424 1) (I35 0)   6  Benign essential HTN (401 1) (I10)   7  Bone pain (733 90) (M89 8X9)   8  Cervical radiculopathy (723 4) (M54 12)   9  Cervical stenosis of spine (723 0) (M48 02)   10  Cervicalgia (723 1) (M54 2)   11  Iron overload (275 09) (E83 19)   12  Lumbar degenerative disc disease (722 52) (M51 36)   13  Lumbar herniated disc (722 10) (M51 26)   14  Lumbar spinal stenosis (724 02) (M48 06)   15  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)   16  Mitral stenosis (394 0) (I05 0)   17  Mixed incontinence (788 33) (N39 46)   18  Muscle pain, myofascial (729 1) (M79 1)   19  Myelodysplastic syndrome (238 75) (D46 9)   20  Nausea (787 02) (R11 0)   21  Need for prophylactic vaccination and inoculation against influenza (V04 81) (Z23)   22  Osteoarthritis of left shoulder (715 91) (M19 012)   23  Osteoarthritis of right shoulder (715 91) (M19 011)   24  Pancytopenia (284 19) (D61 818)   25  Primary localized osteoarthrosis of right shoulder (715 11) (M19 011)   26  Right shoulder pain (719 41) (M25 511)   27  Screening for genitourinary condition (V81 6) (Z13 89)   28  Sjogren's syndrome (710 2) (M35 00)   29  Vertigo (780 4) (R42)    Past Medical History    1  History of Bleeding disorder (287 9) (D68 9)   2  History of hypertension (V12 59) (Z86 79)   3  Myelodysplastic syndrome (238 75) (D46 9)   4  Osteoarthritis of left shoulder (715 91) (M19 012)   5  History of Pleural Effusion (511 9)    Surgical History    1  History of Appendectomy   2  History of Cholecystectomy   3  History of Creation Of Pericardial Window   4  History of Foot Surgery    Family History  Mother    1  Family history of myocardial infarction (V17 3) (Z82 49)  Father    2  Family history of cerebrovascular accident (V17 1) (Z82 3)   3  Family history of diabetes mellitus (V18 0) (Z83 3)  Unknown    4  Family history of cardiac disorder (V17 49) (Z80 55)    Social History    ·    · Never a smoker   · No alcohol use    Current Meds   1  AmLODIPine Besylate 5 MG Oral Tablet; 0 5 tab qhs Recorded   2  AmLODIPine Besylate 5 MG Oral Tablet; Take 1 tablet daily; Last Rx:07Nov2016 Ordered   3  Calcium 500 + D TABS; Therapy: (PNZVIODS:83CUH4713) to Recorded   4  Compazine 5 MG TABS; Therapy: (Recorded:05May2016) to Recorded   5  Cranberry TABS; Therapy: (Recorded:31Mar2015) to Recorded   6  Daily Multiple Vitamins TABS; Therapy: (Recorded:05May2016) to Recorded   7  Doxycycline Hyclate 20 MG Oral Tablet; TAKE 1 TABLET TWICE DAILY; Therapy: (Recorded:05May2016) to Recorded   8  Hydrocodone-Acetaminophen 5-325 MG Oral Tablet; TAKE 1 TABLET Every 4 hours PRN; Therapy: 15JFN7707 to (Evaluate:19Jan2017); Last Rx:75Ulm7943 Ordered   9  Loperamide HCl 2 MG TABS; Take 1 tablet daily; Therapy: (Recorded:04Aug2016) to Recorded   10  Metanx 3-90 314-2-35 MG Oral Capsule; take 1 capsule daily; Therapy: 56YIK2820 to (Evaluate:06Jul2017); Last Rx:75Jyp3711 Ordered   11  Methadone HCl - 5 MG Oral Tablet; 1/2 tab (2 5MG)po bid (with food) and 1 tab at hs  1/2    tab q 4hours prn; Therapy: 64Ioa0532 to (Evaluate:19Jan2017); Last Rx:06Pto4516 Ordered   12  Nitroglycerin 0 4 MG Sublingual Tablet Sublingual; TAKE AS DIRECTED; Therapy: (Recorded:04Aug2016) to Recorded   13  PredniSONE 10 MG Oral Tablet; TAKE 1 TABLET DAILY; Last Rx:80Jrp8336 Ordered   14  PredniSONE 5 MG Oral Tablet; Take 1 tablet twice daily; Therapy: (Recorded:90Plj6021) to Recorded   15   Prochlorperazine Maleate 5 MG Oral Tablet; TAKE 1 TABLET 3 times daily PRN     Requested for: 39IMZ4503; Last Rx:18Ilz2914 Ordered   16  Promethazine HCl - 12 5 MG Oral Tablet; TAKE 1 TABLET 3 times daily; Therapy: (Recorded:63Pjs5961) to Recorded   17  Vitamin B Complex CAPS; Therapy: (Morgan Douglas) to Recorded   18  Vitamin D 1000 UNIT CAPS; Therapy: (YMHXDVUK:72KPE3715) to Recorded    Allergies    1  Robaxin TABS    Vitals  Vital Signs    Recorded: 12Jan2017 03:03PM   Temperature 97 7 F   Heart Rate 81   Respiration 16   Systolic 181   Diastolic 72   Height 4 ft 11 5 in   Weight 116 lb 12 8 oz   BMI Calculated 23 2   BSA Calculated 1 48   O2 Saturation 94   Pain Scale 9     Physical Exam    Constitutional   General appearance: No acute distress, well appearing and well nourished  Eyes   Conjunctiva and lids: No swelling, erythema or discharge  Ears, Nose, Mouth, and Throat   External inspection of ears and nose: Normal     Oropharynx: Normal with no erythema, edema, exudate or lesions  Pulmonary   Auscultation of lungs: Clear to auscultation  Cardiovascular   Auscultation of heart: Normal rate and rhythm, normal S1 and S2, without murmurs  Examination of extremities for edema and/or varicosities: Normal     Abdomen   Abdomen: Non-tender, no masses  Liver and spleen: No hepatomegaly or splenomegaly  Lymphatic   Palpation of lymph nodes in neck: No lymphadenopathy  Musculoskeletal   Gait and station: Normal     Skin   Skin and subcutaneous tissue: Abnormal   +bruises on extremities  Neurologic   Cranial nerves: Cranial nerves 2-12 intact  Psychiatric   Orientation to person, place, and time: Normal          Health Management  Osteoarthritis of right shoulder   * DXA BONE DENSITY SPINE HIP AND PELVIS; every 2 years; Next Due: 91TLV6402; Overdue    Future Appointments    Date/Time Provider Specialty Site   [facility]2017 [facility]/15/2017 01:30 PM ANTWON Ramos   [facility] [address] [place] [address]     Signatures   Electronically signed by : ANTWON Munson DOM D ,DO; Jan 12 2017  3:35PM EST                       (Author)

## 2018-01-11 NOTE — PROGRESS NOTES
Assessment    1  Myelodysplastic syndrome (238 75) (D46 9)    Plan  Myelodysplastic syndrome    · Follow-up visit in 6 weeks Evaluation and Treatment  Follow-up  Status: Complete  Done:  99YGU0137 02:15PM   Ordered; For: Myelodysplastic syndrome; Ordered By: Reno Pop Performed:  Due: 70EHY6791; Last Updated By: Lori Burnette; 10/14/2016 1:23:47 PM    Discussion/Summary  Discussion Summary:   In summary, this is an 60-year-old female history of myelodysplasia as outlined above She has been on Dacogen since October 2011 and enjoys good response  Her interval is every 6-7 weeks  Extending beyond this has been tried in the past and she has significant drop in platelets  She typically needs a unit of blood every 6 or 7 weeks  Platelets do decrease to single digits/ teens but patient declines platelet transfusions and she recovers to 100s  In the 7th week, her platelets are typically in the 90s  Her 40 cycle is due 10/17/16  Patient taking Jadenu secondary to iron overload  She is having more right shoulder pain  She had taken advil with some benefit  She takes oxycodone  She is reminded to refrain from advil, motrin, asa, (nsaids) in the 3rd and 4th weeks of treatment due to profound thrombocytopenia  She continues to decline platelet transfusions  In 2017, she wishes to shorten the interval to 6 weeks as she finds she feels better  Medication SE Review and Pt Understands Tx: Possible side effects of new medications were reviewed with the patient/guardian today  The treatment plan was reviewed with the patient/guardian  The patient/guardian understands and agrees with the treatment plan       Understands and agrees with treatment plan: The treatment plan was reviewed with the patient/guardian   The patient/guardian understands and agrees with the treatment plan       Counseling Documentation With Imm: The patient was counseled regarding diagnostic results, instructions for management, patient and family education, impressions  total time of encounter was 25 minutes  History of Present Illness  Previous Therapy:   Initially diagnosed with Silvano syndrome in about 2000  She was found to have myelodysplasia in September 2011  (-20 Q ), IPSS score, intermediate-1  Dacogen started November 2011 on a every 4 week schedule  This was changed to every 6 week schedule in February 2012  Current Therapy: Dacogen 20 mg per meter squared daily x5, every 6 - 7 weeks  Interval History: Having more in the way of right shoulder pain  Met with Dr Barrera  Intra-articular joint injection without benefit  Right shoulder replacement suggested  Patient doesn't feel she could tolerate  Pain fairly constant but able to sleep  Took advil x 1 tab daily x few days with some benefit  Won't be traveling to [place] this year for Thanksgiving as her 's health has declined significantly for travel  Review of Systems  Complete-Female:   Constitutional: feeling poorly and feeling tired, but no fever and no chills  Eyes: No complaints of eye pain, no red eyes, no eyesight problems, no discharge, no dry eyes, no itching of eyes  ENT: no complaints of earache, no loss of hearing, no nose bleeds, no nasal discharge, no sore throat, no hoarseness  Cardiovascular: No complaints of slow heart rate, no fast heart rate, no chest pain, no palpitations, no leg claudication, no lower extremity edema  Respiratory: No complaints of shortness of breath, no wheezing, no cough, no SOB on exertion, no orthopnea, no PND  Gastrointestinal: no diarrhea    The patient presents with complaints of no constipation (controlled with magnesium )  Genitourinary: No complaints of dysuria, no incontinence, no pelvic pain, no dysmenorrhea, no vaginal discharge or bleeding  Musculoskeletal: lot of right shoulder pain, hx of rotator cuff  , but as noted in HPI     Integumentary: No complaints of skin rash or lesions, no itching, no skin wounds, no breast pain or lump  Neurological: No complaints of headache, no confusion, no convulsions, no numbness, no dizziness or fainting, no tingling, no limb weakness, no difficulty walking  Psychiatric: Not suicidal, no sleep disturbance, no anxiety or depression, no change in personality, no emotional problems  Endocrine: No complaints of proptosis, no hot flashes, no muscle weakness, no deepening of the voice, no feelings of weakness  Hematologic/Lymphatic: No complaints of swollen glands, no swollen glands in the neck, does not bleed easily, does not bruise easily  Active Problems    1  Acute cervical radiculopathy (723 4) (M54 12)   2  Anemia (285 9) (D64 9)   3  Aortic regurgitation (424 1) (I35 1)   4  Aortic stenosis (424 1) (I35 0)   5  Benign essential HTN (401 1) (I10)   6  Bone pain (733 90) (M89 8X9)   7  Cervical radiculopathy (723 4) (M54 12)   8  Cervical stenosis of spine (723 0) (M48 02)   9  Cervicalgia (723 1) (M54 2)   10  Iron overload (275 09) (E83 19)   11  Lumbar degenerative disc disease (722 52) (M51 36)   12  Lumbar herniated disc (722 10) (M51 26)   13  Lumbar spinal stenosis (724 02) (M48 06)   14  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)   15  Mitral stenosis (394 0) (I05 0)   16  Muscle pain, myofascial (729 1) (M79 1)   17  Myelodysplastic syndrome (238 75) (D46 9)   18  Nausea (787 02) (R11 0)   19  Osteoarthritis of left shoulder (715 91) (M19 012)   20  Osteoarthritis of right shoulder (715 91) (M19 011)   21  Pancytopenia (284 19) (D61 818)   22  Primary localized osteoarthrosis of right shoulder (715 11) (M19 011)   23  Right shoulder pain (719 41) (M25 511)   24  Sjogren's syndrome (710 2) (M35 00)    Past Medical History    1  History of Bleeding disorder (287 9) (D68 9)   2  History of hypertension (V12 59) (Z86 79)   3  Myelodysplastic syndrome (238 75) (D46 9)   4  Osteoarthritis of left shoulder (115 91) (M19 012)   5   History of Pleural Effusion (511 9)    Surgical History    1  History of Appendectomy   2  History of Cholecystectomy   3  History of Creation Of Pericardial Window   4  History of Foot Surgery    Family History  Mother    1  Family history of myocardial infarction (V17 3) (Z82 49)  Father    2  Family history of cerebrovascular accident (V17 1) (Z82 3)   3  Family history of diabetes mellitus (V18 0) (Z83 3)  Unknown    4  Family history of cardiac disorder (V17 49) (Z80 55)    Social History    ·    · Never a smoker   · No alcohol use    Current Meds   1  AmLODIPine Besylate 5 MG Oral Tablet; 0 5 tab qhs Recorded   2  AmLODIPine Besylate 5 MG Oral Tablet; Take 1 tablet daily; Therapy: (Recorded:49Qtq3449) to Recorded   3  Calcium 500 + D TABS; Therapy: (SZGKAOIJ:84YTG5751) to Recorded   4  Compazine 5 MG Oral Tablet; Therapy: (Recorded:05May2016) to Recorded   5  Cranberry TABS; Therapy: (Recorded:31Mar2015) to Recorded   6  Daily Multiple Vitamins TABS; Therapy: (Recorded:05May2016) to Recorded   7  Doxycycline Hyclate 20 MG Oral Tablet; TAKE 1 TABLET TWICE DAILY; Therapy: (Recorded:05May2016) to Recorded   8  Hydrocodone-Acetaminophen 5-325 MG Oral Tablet; TAKE 1 TABLET Every 4 hours PRN; Therapy: 90ABD0291 to (Evaluate:26Oct2016); Last Rx:79Fvz9011 Ordered   9  Loperamide HCl 2 MG TABS; Take 1 tablet daily; Therapy: (Recorded:73Fra8454) to Recorded   10  Metanx 3-90 314-2-35 MG Oral Capsule; take 1 capsule daily; Therapy: 46GSL2978 to (Evaluate:81Eez2192); Last Rx:89Qly9823 Ordered   11  Nitroglycerin 0 4 MG Sublingual Tablet Sublingual; TAKE AS DIRECTED; Therapy: (Recorded:59Vqd8525) to Recorded   12  PredniSONE 10 MG Oral Tablet; TAKE 1 TABLET DAILY; Therapy: (Recorded:04Aug2016) to Recorded   13  PredniSONE 5 MG Oral Tablet; Take 1 tablet twice daily; Therapy: (Recorded:04Aug2016) to Recorded   14  Prochlorperazine Maleate 5 MG Oral Tablet; TAKE 1 TABLET 3 TIMES DAILY;     Therapy: (Luna Grewal) to Recorded   15  Promethazine HCl - 12 5 MG Oral Tablet; TAKE 1 TABLET 3 times daily; Therapy: (Recorded:17Vfd0632) to Recorded   16  Vitamin B Complex CAPS; Therapy: (Srinivas Ashraf) to Recorded   17  Vitamin D 1000 UNIT CAPS; Therapy: (ELUZVDHM:35NUU0143) to Recorded    Allergies    1  Robaxin TABS    Vitals  Vital Signs    Recorded: 52LTV4148 78:84LN   Systolic 347   Diastolic 80   Heart Rate 74   Respiration 18   Temperature 96 8 F   Pain Scale 9   O2 Saturation 95   Height 5 ft    Weight 122 lb 6 08 oz   BMI Calculated 23 9   BSA Calculated 1 51     Physical Exam    Constitutional   General appearance: No acute distress, well appearing and well nourished  Ears, Nose, Mouth, and Throat   External inspection of ears and nose: Normal     Oropharynx: Normal with no erythema, edema, exudate or lesions  Pulmonary   Auscultation of lungs: Clear to auscultation  Cardiovascular   Auscultation of heart: Normal rate and rhythm, normal S1 and S2, without murmurs  Examination of extremities for edema and/or varicosities: Normal     Abdomen   Abdomen: Non-tender, no masses  Lymphatic   Palpation of lymph nodes in neck: No lymphadenopathy  Musculoskeletal 4 point walker  Future Appointments    Date/Time Provider Specialty Site   11/09/2016 02:00 PM ANTWON Klein   [facility] [address] [place] [address]     Signatures   Electronically signed by : Armand Brittle, [facility]; Oct 14 2016  1:37PM EST                       (Author)    Electronically signed by : ANTWON Cason D ,DO; Oct 18 2016 10:34AM EST

## 2018-01-12 VITALS
RESPIRATION RATE: 16 BRPM | OXYGEN SATURATION: 94 % | DIASTOLIC BLOOD PRESSURE: 62 MMHG | BODY MASS INDEX: 22.07 KG/M2 | WEIGHT: 113.13 LBS | HEART RATE: 80 BPM | TEMPERATURE: 97.6 F | SYSTOLIC BLOOD PRESSURE: 142 MMHG

## 2018-01-12 NOTE — MISCELLANEOUS
Message   Recorded as Task   Date: 09/06/2016 02:34 PM, Created By: [address]   Task Name: Medical Complaint Callback   Assigned To: Nano Ball   Regarding Patient: Rubia Cedeño, Status: Active   Comment:    [address] - 06 Sep 2016 2:34 PM     TASK CREATED  Caller: Self; Medical Complaint; [phone] (Home)  Patient requesting to speak with you  Last week her BP was elevated at chemotherapy  It is still running on the high side  She would like to discuss the medication she is currently taking and possibly increasing it again like in the past   Please advise  Patient can be reached at [phone]   I called, ok to increase amlodipine 5 mg to 1 5 mg daily for now, if still elevated can go up to 10 mg daily  Watch for adverse reactions of constipation or ankle swelling   Left message, ds      Signatures   Electronically signed by : ANTWON Yin ; Sep  6 2016  2:38PM EST                       (Author)

## 2018-01-12 NOTE — MISCELLANEOUS
History of Present Illness  TCM Communication [facility]: The patient is being contacted for follow-up after hospitalization  Hospital records were reviewed  She was hospitalized at [facility]  The date of admission: 12/30/2016, date of discharge: 01/03/2017  Diagnosis: Neutropenic fever  She was discharged to home  Medications were not reviewed today  The patient is currently asymptomatic  Counseling was provided to the patient  Communication performed and completed by Chilango Escobedo      Active Problems    1  Acute cervical radiculopathy (723 4) (M54 12)   2  Advance directive discussed with patient (V65 49) (Z71 89)   3  Anemia (285 9) (D64 9)   4  Aortic regurgitation (424 1) (I35 1)   5  Aortic stenosis (424 1) (I35 0)   6  Benign essential HTN (401 1) (I10)   7  Bone pain (733 90) (M89 8X9)   8  Cervical radiculopathy (723 4) (M54 12)   9  Cervical stenosis of spine (723 0) (M48 02)   10  Cervicalgia (723 1) (M54 2)   11  Iron overload (275 09) (E83 19)   12  Lumbar degenerative disc disease (722 52) (M51 36)   13  Lumbar herniated disc (722 10) (M51 26)   14  Lumbar spinal stenosis (724 02) (M48 06)   15  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)   16  Mitral stenosis (394 0) (I05 0)   17  Mixed incontinence (788 33) (N39 46)   18  Muscle pain, myofascial (729 1) (M79 1)   19  Myelodysplastic syndrome (238 75) (D46 9)   20  Nausea (787 02) (R11 0)   21  Need for prophylactic vaccination and inoculation against influenza (V04 81) (Z23)   22  Osteoarthritis of left shoulder (715 91) (M19 012)   23  Osteoarthritis of right shoulder (715 91) (M19 011)   24  Pancytopenia (284 19) (D61 818)   25  Primary localized osteoarthrosis of right shoulder (715 11) (M19 011)   26  Right shoulder pain (719 41) (M25 511)   27  Screening for genitourinary condition (V81 6) (Z13 89)   28  Sjogren's syndrome (710 2) (M35 00)   29  Vertigo (780 4) (R42)    Past Medical History    1   History of Bleeding disorder (287 9) (D68 9)   2  History of hypertension (V12 59) (Z86 79)   3  Myelodysplastic syndrome (238 75) (D46 9)   4  Osteoarthritis of left shoulder (715 91) (M19 012)   5  History of Pleural Effusion (511 9)    Surgical History    1  History of Appendectomy   2  History of Cholecystectomy   3  History of Creation Of Pericardial Window   4  History of Foot Surgery    Family History  Mother    1  Family history of myocardial infarction (V17 3) (Z82 49)  Father    2  Family history of cerebrovascular accident (V17 1) (Z82 3)   3  Family history of diabetes mellitus (V18 0) (Z83 3)  Unknown    4  Family history of cardiac disorder (V17 49) (Z80 55)    Social History    ·    · Never a smoker   · No alcohol use    Current Meds   1  AmLODIPine Besylate 5 MG Oral Tablet; 0 5 tab qhs Recorded   2  AmLODIPine Besylate 5 MG Oral Tablet; Take 1 tablet daily; Last Rx:07Nov2016 Ordered   3  Calcium 500 + D TABS; Therapy: (RBWWOCVO:87JGA2567) to Recorded   4  Compazine 5 MG TABS (Prochlorperazine Maleate); Therapy: (Recorded:05May2016) to Recorded   5  Cranberry TABS; Therapy: (Recorded:31Mar2015) to Recorded   6  Daily Multiple Vitamins TABS; Therapy: (Recorded:05May2016) to Recorded   7  Doxycycline Hyclate 20 MG Oral Tablet; TAKE 1 TABLET TWICE DAILY; Therapy: (Recorded:05May2016) to Recorded   8  Hydrocodone-Acetaminophen 5-325 MG Oral Tablet; TAKE 1 TABLET Every 4 hours PRN; Therapy: 55HLD5778 to (Evaluate:19Jan2017); Last Rx:78Wwf1247 Ordered   9  Loperamide HCl 2 MG TABS; Take 1 tablet daily; Therapy: (Recorded:84Czm2639) to Recorded   10  Metanx 3-90 314-2-35 MG Oral Capsule; take 1 capsule daily; Therapy: 03ZHQ6484 to (Evaluate:06Jul2017); Last Rx:28Exy2210 Ordered   11  Methadone HCl - 5 MG Oral Tablet; 1/2 tab (2 5MG)po bid (with food) and 1 tab at hs  1/2    tab q 4hours prn; Therapy: 15Ecv9803 to (Evaluate:19Jan2017); Last Rx:49Fsr4976 Ordered   12   Nitroglycerin 0 4 MG Sublingual Tablet Sublingual; TAKE AS DIRECTED; Therapy: (Recorded:88Pln5066) to Recorded   13  PredniSONE 10 MG Oral Tablet; TAKE 1 TABLET DAILY; Last Rx:77Jut8674 Ordered   14  PredniSONE 5 MG Oral Tablet; Take 1 tablet twice daily; Therapy: (Recorded:09Dil5016) to Recorded   15  Prochlorperazine Maleate 5 MG Oral Tablet; TAKE 1 TABLET 3 times daily PRN     Requested for: 49YBE6081; Last Rx:51Mnc6752 Ordered   16  Promethazine HCl - 12 5 MG Oral Tablet; TAKE 1 TABLET 3 times daily; Therapy: (Recorded:07Slp7770) to Recorded   17  Vitamin B Complex CAPS; Therapy: (Andrés Aguilar) to Recorded   18  Vitamin D 1000 UNIT CAPS; Therapy: ([facility]31PHL2508) to Recorded    Allergies    1  Robaxin TABS    Health Management  Osteoarthritis of right shoulder   * DXA BONE DENSITY SPINE HIP AND PELVIS; every 2 years; Next Due: 34JHV5784; Overdue    Future Appointments    Date/Time Provider Specialty Site   01/12/2017 02:45 PM ANTWON Perez , [facility] H[facility]ology [facility]   01/06/2017 11:00 AM ANTWON Farooq  [facility]   02/15/2017 01:30 PM ANTWON Farooq   [facility]     Signatures   Electronically signed by : Billy Mari, ; Jan 5 2017  7:55AM EST                       (Author)

## 2018-01-12 NOTE — MISCELLANEOUS
Message   Recorded as Task   Date: 01/14/2016 10:31 AM, Created By: Lana Lou   Task Name: Call Back   Assigned To: Elo Guzman   Regarding Patient: Fabricio Magaña, Status: Active   Comment:    Holly Hawkins - 14 Jan 2016 10:31 AM     TASK CREATED  Caller: Self; Other; [phone] (Home)  PT HAD PORT PLACED LAST WEEK  [facility]ION [facility] DIDN'T LIKE THE LOOK OF IT AND CANCELLED HER CHEMO FOR 1 WEEK  WILL SHE BE EXPECTING CHEMO ON MONDAY OR ARE THEY JUST GOING TO LOOK AT HER PORT AGAIN? IS THERE ANYTHING SHE CAN BE DOING FOR IT IN THE MEANTIME? PLEASE CALL, TXS   Spoke with patient today - She reports that her port a cath is looking better than last week - she has been using warm compresses daily  She will be going for treatment Monday - CBC will be drawn that day - Reviewed CBC from 1/11/15 with Dr Laure Tellez  Patient will call with any questions or concerns      Active Problems    1  Acute cervical radiculopathy (723 4) (M54 12)   2  Anemia (285 9) (D64 9)   3  Aortic regurgitation (424 1) (I35 1)   4  Aortic stenosis (424 1) (I35 0)   5  Cervical radiculopathy (723 4) (M54 12)   6  Cervical stenosis of spine (723 0) (M48 02)   7  Cervicalgia (723 1) (M54 2)   8  Iron overload (275 09) (E83 19)   9  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)   10  Mitral stenosis (394 0) (I05 0)   11  Muscle pain, myofascial (729 1) (M79 7)   12  Myelodysplastic syndrome (238 75) (D46 9)   13  Nausea (787 02) (R11 0)   14  Osteoarthritis of left shoulder (715 91) (M19 012)   15  Osteoarthritis of right shoulder (715 91) (M19 011)   16  Pancytopenia (284 19) (D61 818)   17  Primary localized osteoarthrosis of right shoulder (715 11) (M19 011)   18  Right shoulder pain (719 41) (M25 511)   19  Sjogren's syndrome (710 2) (M35 00)    Current Meds   1  Amoxicillin-Pot Clavulanate 875-125 MG Oral Tablet; TAKE 1 TABLET EVERY 12   HOURS DAILY; Therapy: 14KQV1707 to (Evaluate:54Hwj7609)  Requested for: 92ZQF9987;  Last Rx:81Fit2870 Ordered   2  Calcium 500 + D TABS; Therapy: (XFUEGWCL:75PFB9157) to Recorded   3  Cranberry TABS; Therapy: (Recorded:31Mar2015) to Recorded   4  Feosol TABS; Therapy: (Recorded:31Mar2015) to Recorded   5  Green Tea Extract CAPS; Therapy: (Recorded:31Mar2015) to Recorded   6  Lopressor 100 MG Oral Tablet (Metoprolol Tartrate); 1 Tab daily; Therapy: (Recorded:26Mar2014) to Recorded   7  Metoprolol Tartrate TABS Recorded   8  Norvasc 5 MG Oral Tablet (AmLODIPine Besylate); Take 1 tablet daily; Therapy: (Recorded:26Mar2014) to Recorded   9  PredniSONE 5 MG Oral Tablet; TAKE AS DIRECTED  Requested for: 43GYP6392; Last   Rx:77Dty4044 Ordered   10  Probiotic CAPS; Therapy: (Recorded:31Mar2015) to Recorded   11  Vicodin TABS; Therapy: (Recorded:31Mar2015) to Recorded   12  Vitamin B12 TABS; Therapy: (Recorded:31Mar2015) to Recorded   13  Vitamin D 1000 UNIT CAPS; Therapy: (OXUQEUPF:66WZZ7336) to Recorded    Allergies    1   Robaxin TABS    Signatures   Electronically signed by : Daphney Dodge, ; Jan 14 2016  2:22PM EST                       (Author)

## 2018-01-13 VITALS
OXYGEN SATURATION: 94 % | HEIGHT: 60 IN | HEART RATE: 81 BPM | RESPIRATION RATE: 16 BRPM | BODY MASS INDEX: 22.93 KG/M2 | DIASTOLIC BLOOD PRESSURE: 72 MMHG | WEIGHT: 116.8 LBS | TEMPERATURE: 97.7 F | SYSTOLIC BLOOD PRESSURE: 124 MMHG

## 2018-01-13 VITALS
TEMPERATURE: 97.6 F | HEIGHT: 60 IN | DIASTOLIC BLOOD PRESSURE: 64 MMHG | BODY MASS INDEX: 22.58 KG/M2 | WEIGHT: 115 LBS | RESPIRATION RATE: 14 BRPM | HEART RATE: 71 BPM | SYSTOLIC BLOOD PRESSURE: 140 MMHG | OXYGEN SATURATION: 98 %

## 2018-01-13 NOTE — MISCELLANEOUS
Message   Recorded as Task   Date: 01/04/2017 03:32 PM, Created By: Macrina Osuna   Task Name: Call Back   Assigned To: Elo Guzman   Regarding Patient: Darryl Guthrie, Status: Active   Comment:    Holly Hawkins - 04 Jan 2017 3:32 PM     TASK CREATED  Caller: Self; Other; [phone] (Home)  PT WAS RELEASED FROM [facility] YESTERDAY  LOOKING OVER HER PAPERWORK SHE SEES HER HGB IS 8  IT HAD BEEN 9   DO YOU KNOW THE REASON FOR THE DROP? ALSO, WILL SHE NEED TO GET BLOOD? PLEASE CALL TO ADVISE, TXS   Patient reports that she is feeling better this afternoon - She will be having her CBC re checked on Friday and will call me for the results to see if transfusion is needed  Active Problems    1  Acute cervical radiculopathy (723 4) (M54 12)   2  Advance directive discussed with patient (V65 49) (Z71 89)   3  Anemia (285 9) (D64 9)   4  Aortic regurgitation (424 1) (I35 1)   5  Aortic stenosis (424 1) (I35 0)   6  Benign essential HTN (401 1) (I10)   7  Bone pain (733 90) (M89 8X9)   8  Cervical radiculopathy (723 4) (M54 12)   9  Cervical stenosis of spine (723 0) (M48 02)   10  Cervicalgia (723 1) (M54 2)   11  Iron overload (275 09) (E83 19)   12  Lumbar degenerative disc disease (722 52) (M51 36)   13  Lumbar herniated disc (722 10) (M51 26)   14  Lumbar spinal stenosis (724 02) (M48 06)   15  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)   16  Mitral stenosis (394 0) (I05 0)   17  Mixed incontinence (788 33) (N39 46)   18  Muscle pain, myofascial (729 1) (M79 1)   19  Myelodysplastic syndrome (238 75) (D46 9)   20  Nausea (787 02) (R11 0)   21  Need for prophylactic vaccination and inoculation against influenza (V04 81) (Z23)   22  Osteoarthritis of left shoulder (715 91) (M19 012)   23  Osteoarthritis of right shoulder (715 91) (M19 011)   24  Pancytopenia (284 19) (D61 818)   25  Primary localized osteoarthrosis of right shoulder (715 11) (M19 011)   26   Right shoulder pain (719 41) (M29 198) 27  Screening for genitourinary condition (V81 6) (Z13 89)   28  Sjogren's syndrome (710 2) (M35 00)   29  Vertigo (780 4) (R42)    Current Meds   1  AmLODIPine Besylate 5 MG Oral Tablet; 0 5 tab qhs Recorded   2  AmLODIPine Besylate 5 MG Oral Tablet; Take 1 tablet daily; Last Rx:07Nov2016   Ordered   3  Calcium 500 + D TABS; Therapy: (AQVLXRHR:16WQN4650) to Recorded   4  Compazine 5 MG TABS (Prochlorperazine Maleate); Therapy: (Recorded:05May2016) to Recorded   5  Cranberry TABS; Therapy: (Recorded:31Mar2015) to Recorded   6  Daily Multiple Vitamins TABS; Therapy: (Recorded:05May2016) to Recorded   7  Doxycycline Hyclate 20 MG Oral Tablet; TAKE 1 TABLET TWICE DAILY; Therapy: (Recorded:05May2016) to Recorded   8  Hydrocodone-Acetaminophen 5-325 MG Oral Tablet; TAKE 1 TABLET Every 4 hours   PRN; Therapy: 54CWY0732 to (Evaluate:19Jan2017); Last Rx:25Tgi1167 Ordered   9  Loperamide HCl 2 MG TABS; Take 1 tablet daily; Therapy: (Recorded:72Sir6459) to Recorded   10  Metanx 3-90 314-2-35 MG Oral Capsule; take 1 capsule daily; Therapy: 21HFG9695 to (Evaluate:06Jul2017); Last Rx:71Fpt8984 Ordered   11  Methadone HCl - 5 MG Oral Tablet; 1/2 tab (2 5MG)po bid (with food) and 1 tab at hs  1/2    tab q 4hours prn; Therapy: 73Rvp7083 to (Evaluate:19Jan2017); Last Rx:04Cfq5339 Ordered   12  Nitroglycerin 0 4 MG Sublingual Tablet Sublingual; TAKE AS DIRECTED; Therapy: (Recorded:46Bwq1677) to Recorded   13  PredniSONE 10 MG Oral Tablet; TAKE 1 TABLET DAILY; Last Rx:55Hhl2559 Ordered   14  PredniSONE 5 MG Oral Tablet; Take 1 tablet twice daily; Therapy: (Recorded:46Klf1212) to Recorded   15  Prochlorperazine Maleate 5 MG Oral Tablet; TAKE 1 TABLET 3 times daily PRN     Requested for: 92SWH0689; Last Rx:51Sjl2750 Ordered   16  Promethazine HCl - 12 5 MG Oral Tablet; TAKE 1 TABLET 3 times daily; Therapy: (Recorded:42Tiw4461) to Recorded   17  Vitamin B Complex CAPS;     Therapy: (Genita Jump) to Recorded   18  Vitamin D 1000 UNIT CAPS; Therapy: (YRTMSFPY:35FZR6585) to Recorded    Allergies    1   Robaxin TABS    Signatures   Electronically signed by : María Salazar, ; Jan 4 2017  4:34PM EST                       (Author)

## 2018-01-13 NOTE — PROGRESS NOTES
Assessment    1  Myelodysplastic syndrome (238 75) (D46 9)    Plan  Myelodysplastic syndrome    · Follow-up visit in 6 weeks Evaluation and Treatment  Follow-up  Status: Complete  Done:  61PGH7179 02:00PM   Ordered; For: Myelodysplastic syndrome; Ordered By: Austen Deacamila Performed:  Due: 43BIZ6241; Last Updated By: Tory Monsalve; 7/8/2016 1:28:42 PM    Discussion/Summary  Discussion Summary:   In summary, this is an 26-year-old female history of myelodysplasia as outlined above She has been on Dacogen since October 2011 and enjoys good response  Her interval is every 6-7 weeks  Extending beyond this has been tried in the past and she has significant drop in platelets  She typically needs a unit of blood every 6 or 7 weeks  Platelets do decrease to teens but patient declines platelet transfusions and she recovers to 100s  In the 7th week, her platelets are typiclly in the 90   Her 38 cycle is due 7/11/16  Patient taking Jadenu secondary to iron overload  Medication SE Review and Pt Understands Tx: Possible side effects of new medications were reviewed with the patient/guardian today  The treatment plan was reviewed with the patient/guardian  The patient/guardian understands and agrees with the treatment plan   Understands and agrees with treatment plan: The treatment plan was reviewed with the patient/guardian  The patient/guardian understands and agrees with the treatment plan      History of Present Illness  Previous Therapy:   Initially diagnosed with Silvano syndrome in about 2000  She was found to have myelodysplasia in September 2011  (-20 Q ), IPSS score, intermediate-one  Dacogen started November 2011 on a every 4 week schedule  This was changed to every 6 week schedule in February 2012  Current Therapy: Dacogen 20 mg per meter squared daily x5, every 6 weeks  Cycle #1 10/19/11   Interval History: FTransfusion requirements continue to be approximately 2 U PRBC in 6 -7 week cycle   Platelet nadirs to teens  She declines platelet transfusions  Chronic arthralgias related to her rheumatologic disorder  Has been taking Jadenu omitting the 2 weeks that her platelets decrease  Review of Systems  Complete-Female:   Constitutional: No fever, no chills, feels well, no tiredness, no recent weight gain or weight loss, no fever and no chills  Eyes: No complaints of eye pain, no red eyes, no eyesight problems, no discharge, no dry eyes, no itching of eyes  ENT: no complaints of earache, no loss of hearing, no nose bleeds, no nasal discharge, no sore throat, no hoarseness  Cardiovascular: No complaints of slow heart rate, no fast heart rate, no chest pain, no palpitations, no leg claudication, no lower extremity edema  Respiratory: No complaints of shortness of breath, no wheezing, no cough, no SOB on exertion, no orthopnea, no PND  Gastrointestinal: no diarrhea    The patient presents with complaints of no constipation (controlled with magnesium )  Genitourinary: No complaints of dysuria, no incontinence, no pelvic pain, no dysmenorrhea, no vaginal discharge or bleeding  Musculoskeletal: lot of right shoulder pain, hx of rotator cuff  , but as noted in HPI  Integumentary: No complaints of skin rash or lesions, no itching, no skin wounds, no breast pain or lump  Neurological: No complaints of headache, no confusion, no convulsions, no numbness, no dizziness or fainting, no tingling, no limb weakness, no difficulty walking  Psychiatric: Not suicidal, no sleep disturbance, no anxiety or depression, no change in personality, no emotional problems  Endocrine: No complaints of proptosis, no hot flashes, no muscle weakness, no deepening of the voice, no feelings of weakness  Hematologic/Lymphatic: No complaints of swollen glands, no swollen glands in the neck, does not bleed easily, does not bruise easily  Active Problems    1  Acute cervical radiculopathy (723 4) (M54 12)   2   Anemia (285 9) (D64 9)   3  Aortic regurgitation (424 1) (I35 1)   4  Aortic stenosis (747 22) (Q25 3)   5  Cervical radiculopathy (723 4) (M54 12)   6  Cervical stenosis of spine (723 0) (M48 02)   7  Cervicalgia (723 1) (M54 2)   8  Iron overload (275 09) (E83 19)   9  Lumbar degenerative disc disease (722 52) (M51 36)   10  Lumbar herniated disc (722 10) (M51 26)   11  Lumbar spinal stenosis (724 02) (M48 06)   12  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)   13  Mitral stenosis (394 0) (I05 0)   14  Muscle pain, myofascial (729 1) (M79 1)   15  Myelodysplastic syndrome (238 75) (D46 9)   16  Nausea (787 02) (R11 0)   17  Osteoarthritis of left shoulder (715 91) (M19 012)   18  Osteoarthritis of right shoulder (715 91) (M19 011)   19  Pancytopenia (284 19) (D61 818)   20  Primary localized osteoarthrosis of right shoulder (715 11) (M19 011)   21  Right shoulder pain (719 41) (M25 511)   22  Sjogren's syndrome (710 2) (M35 00)    Past Medical History    1  History of Bleeding disorder (287 9) (D68 9)   2  History of hypertension (V12 59) (Z86 79)   3  Myelodysplastic syndrome (238 75) (D46 9)   4  Osteoarthritis of left shoulder (715 91) (M19 012)   5  History of Pleural Effusion (511 9)    Surgical History    1  History of Appendectomy   2  History of Cholecystectomy   3  History of Creation Of Pericardial Window   4  History of Foot Surgery    Family History  Mother    1  Family history of myocardial infarction (V17 3) (Z82 49)  Father    2  Family history of cerebrovascular accident (V17 1) (Z82 3)   3  Family history of diabetes mellitus (V18 0) (Z83 3)  Unknown    4  Family history of cardiac disorder (V17 49) (Z80 55)    Social History    ·    · Never a smoker   · No alcohol use    Current Meds   1  Calcium 500 + D TABS; Therapy: (GCOMNKCS:38VJF6609) to Recorded   2  Compazine 5 MG Oral Tablet; Therapy: (Recorded:35Ttj1355) to Recorded   3  Cranberry TABS;    Therapy: (Recorded:31Mar2015) to Recorded 4  Daily Multiple Vitamins TABS; Therapy: (Recorded:62Xlt7894) to Recorded   5  Doxycycline Hyclate 20 MG Oral Tablet; TAKE 1 TABLET TWICE DAILY; Therapy: (Recorded:05May2016) to Recorded   6  Norvasc 5 MG Oral Tablet; Take 1 tablet daily; Therapy: (Recorded:26Mar2014) to Recorded   7  PredniSONE 10 MG Oral Tablet; TAKE 1 TABLET DAILY; Therapy: (Recorded:05May2016) to  Requested for: 77TSN6113 Recorded   8  Vicodin TABS; Therapy: (Recorded:31Mar2015) to Recorded   9  Vitamin D 1000 UNIT CAPS; Therapy: (MENVAJOZ:35PWO3960) to Recorded    Allergies    1  Robaxin TABS    Vitals  Vital Signs [Data Includes: Current Encounter]    Recorded: 97QOE8894 01:08PM   Temperature 97 4 F   Heart Rate 74   Respiration 16   Systolic 855   Diastolic 70   Weight 983 lb 8 0 oz   O2 Saturation 97     Physical Exam    Constitutional   General appearance: No acute distress, well appearing and well nourished  Eyes   Conjunctiva and lids: No swelling, erythema or discharge  Ears, Nose, Mouth, and Throat   External inspection of ears and nose: Normal     Oropharynx: Normal with no erythema, edema, exudate or lesions  Pulmonary   Auscultation of lungs: Clear to auscultation  Cardiovascular   Auscultation of heart: Normal rate and rhythm, normal S1 and S2, without murmurs  Examination of extremities for edema and/or varicosities: Normal     Abdomen   Abdomen: Non-tender, no masses  Liver and spleen: No hepatomegaly or splenomegaly  Lymphatic   Palpation of lymph nodes in neck: No lymphadenopathy  Musculoskeletal 4 point rolling walker  Future Appointments    Date/Time Provider Specialty Site   08/18/2016 01:15 PM [facility] [address] MEDICAL ONCOLOGY   08/05/2016 02:30 PM ANTWON Enrique   [facility]     Signatures   Electronically signed by : Ana Urban, [facility]; Jul 8 2016  1:32PM EST (Author)    Electronically signed by : Severiano Amsterdam, M D DOM D ,DO; Jul 8 2016  5:30PM EST

## 2018-01-14 VITALS
RESPIRATION RATE: 18 BRPM | HEART RATE: 84 BPM | TEMPERATURE: 98.6 F | SYSTOLIC BLOOD PRESSURE: 135 MMHG | OXYGEN SATURATION: 98 % | WEIGHT: 108 LBS | DIASTOLIC BLOOD PRESSURE: 78 MMHG | BODY MASS INDEX: 21.2 KG/M2

## 2018-01-14 VITALS
SYSTOLIC BLOOD PRESSURE: 150 MMHG | WEIGHT: 113 LBS | HEIGHT: 60 IN | RESPIRATION RATE: 12 BRPM | HEART RATE: 78 BPM | DIASTOLIC BLOOD PRESSURE: 90 MMHG | BODY MASS INDEX: 22.19 KG/M2

## 2018-01-14 VITALS
HEIGHT: 59 IN | DIASTOLIC BLOOD PRESSURE: 62 MMHG | RESPIRATION RATE: 18 BRPM | OXYGEN SATURATION: 98 % | HEART RATE: 91 BPM | BODY MASS INDEX: 21.85 KG/M2 | SYSTOLIC BLOOD PRESSURE: 122 MMHG | TEMPERATURE: 97.7 F | WEIGHT: 108.38 LBS

## 2018-01-14 VITALS
TEMPERATURE: 97.5 F | SYSTOLIC BLOOD PRESSURE: 130 MMHG | HEART RATE: 80 BPM | BODY MASS INDEX: 22.25 KG/M2 | HEIGHT: 60 IN | WEIGHT: 113.31 LBS | DIASTOLIC BLOOD PRESSURE: 70 MMHG | OXYGEN SATURATION: 96 % | RESPIRATION RATE: 16 BRPM

## 2018-01-14 VITALS
HEART RATE: 88 BPM | SYSTOLIC BLOOD PRESSURE: 108 MMHG | RESPIRATION RATE: 18 BRPM | DIASTOLIC BLOOD PRESSURE: 70 MMHG | WEIGHT: 109.56 LBS | BODY MASS INDEX: 22.09 KG/M2 | HEIGHT: 59 IN | TEMPERATURE: 96.3 F

## 2018-01-14 NOTE — MISCELLANEOUS
Chief Complaint  Chief Complaint Free Text Note Form: No BREE was made for this patient because she was discharged home with hospice  Active Problems    1  Acute cervical radiculopathy (723 4) (M54 12)   2  Advance directive discussed with patient (V65 49) (Z71 89)   3  Anemia (285 9) (D64 9)   4  Aortic regurgitation (424 1) (I35 1)   5  Aortic stenosis (424 1) (I35 0)   6  Benign essential HTN (401 1) (I10)   7  Bone pain (733 90) (M89 8X9)   8  Cervical radiculopathy (723 4) (M54 12)   9  Cervical stenosis of spine (723 0) (M48 02)   10  Cervicalgia (723 1) (M54 2)   11  Chemotherapy-induced nausea (787 02,E933 1) (R11 0,T45  1X5A)   12  Cough (786 2) (R05)   13  Dizziness (780 4) (R42)   14  Iron overload (275 09) (E83 19)   15  Lumbar degenerative disc disease (722 52) (M51 36)   16  Lumbar herniated disc (722 10) (M51 26)   17  Lumbar spinal stenosis (724 02) (M48 06)   18  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)   19  Mitral stenosis (394 0) (I05 0)   20  Muscle pain, myofascial (729 1) (M79 1)   21  Myelodysplastic syndrome (238 75) (D46 9)   22  Need for prophylactic vaccination and inoculation against influenza (V04 81) (Z23)   23  Osteoarthritis of left shoulder (715 91) (M19 012)   24  Osteoarthritis of right shoulder (715 91) (M19 011)   25  Pancytopenia (284 19) (D61 818)   26  Primary localized osteoarthrosis of right shoulder (715 11) (M19 011)   27  Right shoulder pain (719 41) (M25 511)   28  Screening for genitourinary condition (V81 6) (Z13 89)   29  Sjogren's syndrome (710 2) (M35 00)   30  Skin tear of right forearm without complication, subsequent encounter (V58 89,881 00)    (S51 811D)   31  Traumatic hematoma of right upper arm (923 03) (S40 021A)   32  Urinary incontinence, mixed (788 33) (N39 46)   33  Wound of skin (782 9) (R23 8)    Past Medical History    1  History of Bleeding disorder (287 9) (D68 9)   2  History of hypertension (V12 59) (Z86 79)   3   Myelodysplastic syndrome (238 75) (D46 9)   4  Osteoarthritis of left shoulder (715 91) (M19 012)   5  History of Pleural Effusion (511 9)    Surgical History    1  History of Appendectomy   2  History of Cholecystectomy   3  History of Creation Of Pericardial Window   4  History of Foot Surgery    Family History  Mother    1  Family history of myocardial infarction (V17 3) (Z82 49)  Father    2  Family history of cerebrovascular accident (V17 1) (Z82 3)   3  Family history of diabetes mellitus (V18 0) (Z83 3)  Unknown    4  Family history of cardiac disorder (V17 49) (Z80 55)    Social History    ·    · Never a smoker   · No alcohol use    Current Meds   1  AmLODIPine Besylate 5 MG Oral Tablet; 0 5 tab qhs Recorded   2  AmLODIPine Besylate 5 MG Oral Tablet; Take 1 tablet daily; Last Rx:07Nov2016 Ordered   3  Benzonatate 100 MG Oral Capsule; TAKE 1 CAPSULE EVERY 8 HOURS AS NEEDED; Therapy: 05OWO4436 to (Evaluate:26Rww9627)  Requested for: 72CEO6536; Last   Rx:15Feb2017 Ordered   4  Doxycycline Hyclate 20 MG Oral Tablet; TAKE 1 TABLET TWICE DAILY; Therapy: (Recorded:63Jdn4928) to Recorded   5  Hydrocodone-Acetaminophen 5-325 MG Oral Tablet; TAKE 1-2 TABLETs Every 4 hours   PRN pain; Therapy: 15HXD1251 to (Evaluate:51Rbx3640); Last Rx:19Apr2017 Ordered   6  Loperamide HCl - 2 MG Oral Capsule; take 1 capsule daily; Therapy: 52MYS6260 to (Evaluate:58Phc1806)  Requested for: 44Hos9956; Last   Rx:07Geb3213 Ordered   7  Meclizine HCl - 25 MG Oral Tablet; TAKE 1 TABLET 3 times daily PRN; Therapy: 98IKI3393 to (Evaluate:96Gaj0629)  Requested for: 31QTH0405; Last   Rx:68Xcn5604 Ordered   8  Metanx 3-90 314-2-35 MG Oral Capsule; take 1 capsule daily; Therapy: 91NUF7189 to (Evaluate:01Htl6282)  Requested for: 73Bqi9391; Last   Rx:36Mhu4582 Ordered   9  OLANZapine 2 5 MG Oral Tablet; TAKE 1 TABLET Bedtime; Therapy: 81FWJ9981 to (Evaluate:12Apr2017)  Requested for: 66JJS8927; Last   Rx:13Mar2017 Ordered   10   Ondansetron HCl - 4 MG Oral Tablet; TAKE 1 TABLET Every 4 hours PRN NAUSEA MAX 6    TABS/DAY; Therapy: 83HGX1364 to (Evaluate:54Mjy4073)  Requested for: 66IXT2602; Last    Rx:94Ner1334 Ordered   11  PredniSONE 10 MG Oral Tablet; TAKE 1 TABLET DAILY; Last Rx:00Kqz0037 Ordered   12  PredniSONE 5 MG Oral Tablet; Taker 1 tab daily at dinner; Therapy: 37Pze1342 to (Evaluate:86Rlj2577)  Requested for: 88Lpm9040; Last    Rx:03Aez0299 Ordered   13  Vitamin B Complex CAPS; Therapy: (Catalina Siegel) to Recorded   14  Vitamin D 1000 UNIT CAPS; Therapy: (JLLPFMFZ:82FLH7814) to Recorded    Allergies    1  Robaxin TABS    Health Management  Osteoarthritis of right shoulder   * DXA BONE DENSITY SPINE HIP AND PELVIS; every 2 years; Next Due: 21CIN1541; Overdue    Future Appointments    Date/Time Provider Specialty Site   05/18/2017 02:30 PM ANTWON Kwok , [facility] [address] [place] [address] ONCOLOGY   05/30/2017 03:00 PM ANTWON Gupta   [facility]     Signatures   Electronically signed by : Colin Mendes, ; May 10 2017  7:37AM EST                       (Author)    Electronically signed by : ANTWON Vee ; May 10 2017  3:09PM EST                       (Review)

## 2018-01-15 NOTE — MISCELLANEOUS
Message  patients daughter will be taking her to the ER for evaluation - patient has fever and chills  Active Problems    1  Acute cervical radiculopathy (723 4) (M54 12)   2  Advance directive discussed with patient (V65 49) (Z71 89)   3  Anemia (285 9) (D64 9)   4  Aortic regurgitation (424 1) (I35 1)   5  Aortic stenosis (424 1) (I35 0)   6  Benign essential HTN (401 1) (I10)   7  Bone pain (733 90) (M89 8X9)   8  Cervical radiculopathy (723 4) (M54 12)   9  Cervical stenosis of spine (723 0) (M48 02)   10  Cervicalgia (723 1) (M54 2)   11  Chemotherapy-induced nausea (787 02,E933 1) (R11 0,T45  1X5A)   12  Cough (786 2) (R05)   13  Dizziness (780 4) (R42)   14  Iron overload (275 09) (E83 19)   15  Lumbar degenerative disc disease (722 52) (M51 36)   16  Lumbar herniated disc (722 10) (M51 26)   17  Lumbar spinal stenosis (724 02) (M48 06)   18  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)   19  Mitral stenosis (394 0) (I05 0)   20  Muscle pain, myofascial (729 1) (M79 1)   21  Myelodysplastic syndrome (238 75) (D46 9)   22  Need for prophylactic vaccination and inoculation against influenza (V04 81) (Z23)   23  Osteoarthritis of left shoulder (715 91) (M19 012)   24  Osteoarthritis of right shoulder (715 91) (M19 011)   25  Pancytopenia (284 19) (D61 818)   26  Primary localized osteoarthrosis of right shoulder (715 11) (M19 011)   27  Right shoulder pain (719 41) (M25 511)   28  Screening for genitourinary condition (V81 6) (Z13 89)   29  Sjogren's syndrome (710 2) (M35 00)   30  Skin tear of right forearm without complication, subsequent encounter (V58 89,881 00)    (S51 811D)   31  Traumatic hematoma of right upper arm (923 03) (S40 021A)   32  Urinary incontinence, mixed (788 33) (N39 46)   33  Wound of skin (782 9) (R23 8)    Current Meds   1  AmLODIPine Besylate 5 MG Oral Tablet; 0 5 tab qhs Recorded   2  AmLODIPine Besylate 5 MG Oral Tablet; Take 1 tablet daily;  Last HU:68MJR3654 Ordered   3  Benzonatate 100 MG Oral Capsule; TAKE 1 CAPSULE EVERY 8 HOURS AS NEEDED; Therapy: 12HMM6357 to (Evaluate:24Apr2017)  Requested for: 62YNB0605; Last   Rx:66Dze2618 Ordered   4  Doxycycline Hyclate 20 MG Oral Tablet; TAKE 1 TABLET TWICE DAILY; Therapy: (Recorded:43Vbt6140) to Recorded   5  Hydrocodone-Acetaminophen 5-325 MG Oral Tablet; TAKE 1-2 TABLETs Every 4 hours   PRN pain; Therapy: 72CBD6548 to (Evaluate:19May2017); Last Rx:19Apr2017 Ordered   6  Loperamide HCl - 2 MG Oral Capsule; take 1 capsule daily; Therapy: 45YGK6506 to (Evaluate:10May2017)  Requested for: 11Uya2337; Last   Rx:84Gaa8497 Ordered   7  Meclizine HCl - 25 MG Oral Tablet; TAKE 1 TABLET 3 times daily PRN; Therapy: 33ECW5296 to (Evaluate:24Apr2017)  Requested for: 44HWB0163; Last   Rx:31Czt1987 Ordered   8  Metanx 3-90 314-2-35 MG Oral Capsule; take 1 capsule daily; Therapy: 01TUC4370 to (Evaluate:22Hrg5928)  Requested for: 11Ihb1547; Last   Rx:75Xgl9125 Ordered   9  OLANZapine 2 5 MG Oral Tablet; TAKE 1 TABLET Bedtime; Therapy: 60VKO0534 to (Evaluate:12Apr2017)  Requested for: 91MOF4119; Last   Rx:13Mar2017 Ordered   10  Ondansetron HCl - 4 MG Oral Tablet; TAKE 1 TABLET Every 4 hours PRN NAUSEA MAX    6 TABS/DAY; Therapy: 16RTO8406 to (Evaluate:12Apr2017)  Requested for: 51EAF1698; Last    Rx:13Mar2017 Ordered   11  PredniSONE 10 MG Oral Tablet; TAKE 1 TABLET DAILY; Last Rx:70Ost1344 Ordered   12  PredniSONE 5 MG Oral Tablet; Taker 1 tab daily at dinner; Therapy: 78Azr6229 to (Evaluate:24May2017)  Requested for: 40Fre8876; Last    Rx:10Qti5184 Ordered   13  Vitamin B Complex CAPS; Therapy: (Rona Blizzard) to Recorded   14  Vitamin D 1000 UNIT CAPS; Therapy: (PVASNQUE:50VXQ7097) to Recorded    Allergies    1   Robaxin TABS    Signatures   Electronically signed by : Jace Bowen, ; Apr 25 2017 10:45AM EST                       (Author)

## 2018-01-15 NOTE — MISCELLANEOUS
Message   Recorded as Task   Date: 03/02/2016 02:18 PM, Created By: Johnnie Ruano   Task Name: Follow Up   Assigned To: Johnnie Ruano   Regarding Patient: Mike Mc, Status: Complete   Comment:    Johnnie Ruano - 02 Mar 2016 2:18 PM     TASK CREATED  Received patient's ferritin level  Value is 2558  Had been in 3394-4158 range previously  At last visit, discussed possibility of Jadenu  Starting dose is 14mg/kg/day and dose reduction 50% for crcl 40-60  Her calculated crcl is 42 mL/min  Therefore starting dose would be (1) 360mg tablet/ day  Called patient  Left message to return call regarding above  Per [email or web address], can be submitted to specialty pharmacy  Johnnie Ruano - 02 Mar 2016 2:18 PM     TASK IN PROGRESS   Johnnie Ruano - 96 Mar 2016 3:36 PM     TASK EDITED  Called patient's home  Spoke with   I will call patient at infusion center 3/4 Sudha Mcgee - 04 Mar 2016 3:25 PM     TASK COMPLETED   Met with patient in infusion center 3/4/16  Reviewed ferritn and plan to try Jadenu  Samples #30 given  Reveiwed potential side effects  Questions asked and answered  Signed informed consent obtained        Signatures   Electronically signed by : Cha Baron, [facility]; Mar  7 2016  4:22PM EST                       (Author)

## 2018-01-16 NOTE — PROGRESS NOTES
Assessment    1  Myelodysplastic syndrome (238 75) (D46 9)    Plan  Myelodysplastic syndrome    · Drink plenty of fluids ; Status:Complete;   Done: 10EQX0876   Ordered; For:Myelodysplastic syndrome; Ordered By:Attila [place];   · Follow-up visit in 6 weeks Evaluation and Treatment  Follow-up  Status: Hold For -  Scheduling  Requested for: 48Vee9452   Ordered; For: Myelodysplastic syndrome; Ordered By: Manuel Ponce Performed:  Due: 93ZAB8519    Discussion/Summary  Discussion Summary:   In summary, this is a 80year old female with a history of MDS as outlined  She has myelodysplasia  She continues to respond reasonably well to Dacogen on a every 6-7 week basis  Ferritin is gradually improving on Jadenu  She tolerates this fairly well  Occasionally, she needs red blood cell transfusions  Quality of life is somewhat impacted by her other medical problems, more so than her hematologic abnormalities  I wouldn't make any changes in her care at this time  We'll see her back in 6 weeks for review  Understands and agrees with treatment plan: The treatment plan was reviewed with the patient/guardian  The patient/guardian understands and agrees with the treatment plan   Counseling Documentation With Imm: The patient was counseled regarding diagnostic results, instructions for management, patient and family education, impressions  total time of encounter was 25 minutes  Chief Complaint  Chief Complaint Free Text Note Form: Follow-up regarding myelodysplasia  History of Present Illness  Previous Therapy:   Initially diagnosed with Silvano syndrome in about 2000  She was found to have myelodysplasia in September 2011  (-20 Q ), IPSS score, intermediate-1  Dacogen started November 2011 on a every 4 week schedule  This was changed to every 6 week schedule in February 2012  Current Therapy: Dacogen 20 mg per meter squared daily x5, every 6 weeks     Interval History: Needed 2 UPRBC's    5 days ago woke in the night to urinate  Noted Gross hematuria  Has an external hemorrhoid  Has had occasional small volume hematochezia at times but not recently  Review of Systems  Complete-Female:   Constitutional: No fever, no chills, feels well, no tiredness, no recent weight gain or weight loss, no fever and no chills  Eyes: No complaints of eye pain, no red eyes, no eyesight problems, no discharge, no dry eyes, no itching of eyes  ENT: no complaints of earache, no loss of hearing, no nose bleeds, no nasal discharge, no sore throat, no hoarseness  Cardiovascular: No complaints of slow heart rate, no fast heart rate, no chest pain, no palpitations, no leg claudication, no lower extremity edema  Respiratory: No complaints of shortness of breath, no wheezing, no cough, no SOB on exertion, no orthopnea, no PND  Gastrointestinal: no diarrhea    The patient presents with complaints of no constipation (controlled with magnesium )  Genitourinary: No complaints of dysuria, no incontinence, no pelvic pain, no dysmenorrhea, no vaginal discharge or bleeding  Musculoskeletal: lot of right shoulder pain, hx of rotator cuff  , but as noted in HPI  Integumentary: No complaints of skin rash or lesions, no itching, no skin wounds, no breast pain or lump  Neurological: No complaints of headache, no confusion, no convulsions, no numbness, no dizziness or fainting, no tingling, no limb weakness, no difficulty walking  Psychiatric: Not suicidal, no sleep disturbance, no anxiety or depression, no change in personality, no emotional problems  Endocrine: No complaints of proptosis, no hot flashes, no muscle weakness, no deepening of the voice, no feelings of weakness  Hematologic/Lymphatic: No complaints of swollen glands, no swollen glands in the neck, does not bleed easily, does not bruise easily  Active Problems    1  Acute cervical radiculopathy (723 4) (M54 12)   2  Anemia (285 9) (D64 9)   3   Aortic regurgitation (424  1) (I35 1)   4  Aortic stenosis (424 1) (I35 0)   5  Benign essential HTN (401 1) (I10)   6  Bone pain (733 90) (M89 8X9)   7  Cervical radiculopathy (723 4) (M54 12)   8  Cervical stenosis of spine (723 0) (M48 02)   9  Cervicalgia (723 1) (M54 2)   10  Iron overload (275 09) (E83 19)   11  Lumbar degenerative disc disease (722 52) (M51 36)   12  Lumbar herniated disc (722 10) (M51 26)   13  Lumbar spinal stenosis (724 02) (M48 06)   14  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)   15  Mitral stenosis (394 0) (I05 0)   16  Muscle pain, myofascial (729 1) (M79 1)   17  Myelodysplastic syndrome (238 75) (D46 9)   18  Nausea (787 02) (R11 0)   19  Osteoarthritis of left shoulder (715 91) (M19 012)   20  Osteoarthritis of right shoulder (715 91) (M19 011)   21  Pancytopenia (284 19) (D61 818)   22  Primary localized osteoarthrosis of right shoulder (715 11) (M19 011)   23  Right shoulder pain (719 41) (M25 511)   24  Sjogren's syndrome (710 2) (M35 00)    Past Medical History    1  History of Bleeding disorder (287 9) (D68 9)   2  History of hypertension (V12 59) (Z86 79)   3  Myelodysplastic syndrome (238 75) (D46 9)   4  Osteoarthritis of left shoulder (715 91) (M19 012)   5  History of Pleural Effusion (511 9)    Surgical History    1  History of Appendectomy   2  History of Cholecystectomy   3  History of Creation Of Pericardial Window   4  History of Foot Surgery    Family History  Mother    1  Family history of myocardial infarction (V17 3) (Z82 49)  Father    2  Family history of cerebrovascular accident (V17 1) (Z82 3)   3  Family history of diabetes mellitus (V18 0) (Z83 3)  Unknown    4  Family history of cardiac disorder (V17 49) (Z80 55)    Social History    ·    · Never a smoker   · No alcohol use    Current Meds   1  AmLODIPine Besylate 5 MG Oral Tablet; Take 1 tablet daily; Therapy: (Recorded:41Eub1957) to Recorded   2  Calcium 500 + D TABS;    Therapy: (NILGU56WEP8470) to Recorded   3  Compazine 5 MG Oral Tablet; Therapy: (Recorded:05May2016) to Recorded   4  Cranberry TABS; Therapy: (Recorded:31Mar2015) to Recorded   5  Daily Multiple Vitamins TABS; Therapy: (Recorded:05May2016) to Recorded   6  Doxycycline Hyclate 20 MG Oral Tablet; TAKE 1 TABLET TWICE DAILY; Therapy: (Recorded:05May2016) to Recorded   7  Loperamide HCl 2 MG TABS; Take 1 tablet daily; Therapy: (Recorded:05Olh0843) to Recorded   8  Metanx 3-90 314-2-35 MG Oral Capsule; take 1 capsule daily; Therapy: 44MAW2626 to (Evaluate:45Fkf0151); Last Rx:90Qbg9031 Ordered   9  Nitroglycerin 0 4 MG SUBL; TAKE AS DIRECTED; Therapy: (Recorded:37Xim1099) to Recorded   10  PredniSONE 10 MG Oral Tablet; TAKE 1 TABLET DAILY; Therapy: (Recorded:45Fsm4033) to Recorded   11  PredniSONE 5 MG Oral Tablet; Take 1 tablet twice daily; Therapy: (Recorded:04Aug2016) to Recorded   12  Prochlorperazine Maleate 5 MG Oral Tablet; TAKE 1 TABLET 3 TIMES DAILY; Therapy: (Recorded:04Aug2016) to Recorded   13  Promethazine HCl - 12 5 MG Oral Tablet; TAKE 1 TABLET 3 times daily; Therapy: (Recorded:19Cbt9013) to Recorded   14  Vitamin B Complex CAPS; Therapy: (Tabitha Tate) to Recorded   15  Vitamin D 1000 UNIT CAPS; Therapy: (CTPLNHZI:47QVV7905) to Recorded    Allergies    1  Robaxin TABS    Vitals  Vital Signs    Recorded: 24BNY2552 80:94ZL   Systolic 589   Diastolic 76   Heart Rate 62   Respiration 16   Temperature 97 9 F   Pain Scale 0   O2 Saturation 94   Height 5 ft    Weight 121 lb    BMI Calculated 23 63   BSA Calculated 1 51     Physical Exam    Constitutional   General appearance: No acute distress, well appearing and well nourished  Eyes   Conjunctiva and lids: No swelling, erythema or discharge  Ears, Nose, Mouth, and Throat   External inspection of ears and nose: Normal     Oropharynx: Normal with no erythema, edema, exudate or lesions  Pulmonary   Auscultation of lungs: Clear to auscultation  Cardiovascular   Auscultation of heart: Normal rate and rhythm, normal S1 and S2, without murmurs  Examination of extremities for edema and/or varicosities: Normal     Abdomen   Abdomen: Non-tender, no masses  Liver and spleen: No hepatomegaly or splenomegaly  Lymphatic   Palpation of lymph nodes in neck: No lymphadenopathy  Future Appointments    Date/Time Provider Specialty Site   11/09/2016 02:00 PM ANTWON Enrique   Internal Medicine [facility]     Signatures   Electronically signed by : ANTWON Gu DOM D ,DO; Aug 25 2016  2:47PM EST                       (Author)

## 2018-01-16 NOTE — MISCELLANEOUS
Message     Recorded as Task   Date: 10/24/2016 10:52 AM, Created By: Glenis Zhang   Task Name: Call Back   Assigned To: Caryn Khalilix   Regarding Patient: Corry Voss, Status: Active   Comment:    Holly Hawkins - 24 Oct 2016 10:52 AM     TASK CREATED  Caller: ARMIDA; Other; [phone] (Work)  CALL FROM [facility] TO CONFIRM DOSAGE OF [place] - 24 Oct 2016 1:06 PM     TASK EDITED  Called and verified Jadenu dose with diplomat  Confirmed that pt is still receiving blood transfusions every 6 to 8 weeks and is refusing plt transfusions at this time  Diplomat will call pt  Active Problems    1  Acute cervical radiculopathy (723 4) (M54 12)   2  Anemia (285 9) (D64 9)   3  Aortic regurgitation (424 1) (I35 1)   4  Aortic stenosis (424 1) (I35 0)   5  Benign essential HTN (401 1) (I10)   6  Bone pain (733 90) (M89 8X9)   7  Cervical radiculopathy (723 4) (M54 12)   8  Cervical stenosis of spine (723 0) (M48 02)   9  Cervicalgia (723 1) (M54 2)   10  Iron overload (275 09) (E83 19)   11  Lumbar degenerative disc disease (722 52) (M51 36)   12  Lumbar herniated disc (722 10) (M51 26)   13  Lumbar spinal stenosis (724 02) (M48 06)   14  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)   15  Mitral stenosis (394 0) (I05 0)   16  Muscle pain, myofascial (729 1) (M79 1)   17  Myelodysplastic syndrome (238 75) (D46 9)   18  Nausea (787 02) (R11 0)   19  Osteoarthritis of left shoulder (715 91) (M19 012)   20  Osteoarthritis of right shoulder (715 91) (M19 011)   21  Pancytopenia (284 19) (D61 818)   22  Primary localized osteoarthrosis of right shoulder (715 11) (M19 011)   23  Right shoulder pain (719 41) (M25 511)   24  Sjogren's syndrome (710 2) (M35 00)    Current Meds   1  AmLODIPine Besylate 5 MG Oral Tablet; 0 5 tab qhs Recorded   2  AmLODIPine Besylate 5 MG Oral Tablet; Take 1 tablet daily; Therapy: (Recorded:98Ltv0341) to Recorded   3   Calcium 500 + D TABS;   Therapy: (Recorded:04Jan2013) to Recorded   4  Compazine 5 MG TABS (Prochlorperazine Maleate); Therapy: (Recorded:05May2016) to Recorded   5  Cranberry TABS; Therapy: (Recorded:31Mar2015) to Recorded   6  Daily Multiple Vitamins TABS; Therapy: (Recorded:05May2016) to Recorded   7  Doxycycline Hyclate 20 MG Oral Tablet; TAKE 1 TABLET TWICE DAILY; Therapy: (Recorded:05May2016) to Recorded   8  Hydrocodone-Acetaminophen 5-325 MG Oral Tablet; TAKE 1 TABLET Every 4 hours   PRN; Therapy: 29BFK1904 to (Evaluate:26Oct2016); Last Rx:52Ixt8674 Ordered   9  Loperamide HCl 2 MG TABS; Take 1 tablet daily; Therapy: (Recorded:30Tpo1421) to Recorded   10  Metanx 3-90 314-2-35 MG Oral Capsule; take 1 capsule daily; Therapy: 26KLO4124 to (Evaluate:03Hne5187); Last Rx:13Oux3377 Ordered   11  Nitroglycerin 0 4 MG Sublingual Tablet Sublingual; TAKE AS DIRECTED; Therapy: (Recorded:63Ktg1935) to Recorded   12  PredniSONE 10 MG Oral Tablet; TAKE 1 TABLET DAILY; Therapy: (Recorded:10Crb0668) to Recorded   13  PredniSONE 5 MG Oral Tablet; Take 1 tablet twice daily; Therapy: (Recorded:67Gkm0317) to Recorded   14  Prochlorperazine Maleate 5 MG Oral Tablet; TAKE 1 TABLET 3 TIMES DAILY; Therapy: (Recorded:02Itx5214) to Recorded   15  Promethazine HCl - 12 5 MG Oral Tablet; TAKE 1 TABLET 3 times daily; Therapy: (Recorded:09Fol8867) to Recorded   16  Vitamin B Complex CAPS; Therapy: (Dilcia Montiel) to Recorded   17  Vitamin D 1000 UNIT CAPS; Therapy: (ODNIPAHX:66RGH6876) to Recorded    Allergies    1  Robaxin TABS    Signatures   Electronically signed by :  Tyesha Holley RN; Oct 24 2016  1:07PM EST                       (Author)

## 2018-01-17 NOTE — PROGRESS NOTES
Assessment    1  Lumbar spinal stenosis (724 02) (M48 06)   2  Lumbar degenerative disc disease (722 52) (M51 36)   3  Lumbar herniated disc (722 10) (M51 26)    Plan  Myelodysplastic syndrome    · Amoxicillin-Pot Clavulanate 875-125 MG Oral Tablet  Unlinked    · Vitamin B12 TABS    Discussion/Summary    The patient was seen and examined by Dr Samra Cintron and myself  We discussed with the patient and her daughter that at this point, we do not have much to offer her as lumbar spine injections, as well as spine surgery are not options  We recommend she continue physical therapy, with lower extremity strengthening exercises  An AFO was also ordered for the left foot  We'll see her back on an as-needed basis  The patient's questions were answered  Chief Complaint    1  Back Pain  low back pain and left foot drop      History of Present Illness  HPI: Hannah Rodriguez is an 80year old female presenting to the office for low back and leg pain, as well as a recent development of inability to dorsiflex her left foot  She states that in November she had an acute back pain episode that caused her to be in bed for a week, and then she was admitted to [facility] for almost 1 week  She does have myelodysplastic disorder and has 5 days of chemotherapy every 6 weeks  Her oncologist states that she may not have any spine surgery or spine injections as her platelet count is too low  She states that she does not have pain radiating down the legs  She describes her pain as a starburst type pain  She has noticed this foot drop in the past two days  She is doing PT at home with [facility]  She denies any bowel or bladder incontinence  She does ambulate with a walker  Review of Systems    Constitutional: No fever, no chills, feels well, no tiredness, no recent weight gain or loss  Eyes: No complaints of eyesight problems, no red eyes  ENT: no loss of hearing, no nosebleeds, no sore throat     Cardiovascular: No complaints of chest pain, no palpitations, no leg claudication or lower extremity edema  Respiratory: no compliants of shortness of breath, no wheezing, no cough  Gastrointestinal: no complaints of abdominal pain, no constipation, no nausea or diarrhea, no vomiting, no bloody stools  Genitourinary: no complaints of dysuria, no incontinence  Musculoskeletal: as noted in HPI  Integumentary: no complaints of skin rash or lesion, no itching or dry skin, no skin wounds  Neurological: no complaints of headache, no confusion, no numbness or tingling, no dizziness  Endocrine: No complaints of muscle weakness, no feelings of weakness, no frequent urination, no excessive thirst    Psychiatric: No suicidal thoughts, no anxiety, no feelings of depression  ROS reviewed  Active Problems    1  Acute cervical radiculopathy (723 4) (M54 12)   2  Anemia (285 9) (D64 9)   3  Aortic regurgitation (424 1) (I35 1)   4  Aortic stenosis (424 1) (I35 0)   5  Cervical radiculopathy (723 4) (M54 12)   6  Cervical stenosis of spine (723 0) (M48 02)   7  Cervicalgia (723 1) (M54 2)   8  Iron overload (275 09) (E83 19)   9  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)   10  Mitral stenosis (394 0) (I05 0)   11  Muscle pain, myofascial (729 1) (M79 7)   12  Myelodysplastic syndrome (238 75) (D46 9)   13  Nausea (787 02) (R11 0)   14  Osteoarthritis of left shoulder (715 91) (M19 012)   15  Osteoarthritis of right shoulder (715 91) (M19 011)   16  Pancytopenia (284 19) (D61 818)   17  Primary localized osteoarthrosis of right shoulder (715 11) (M19 011)   18  Right shoulder pain (719 41) (M25 511)   19   Sjogren's syndrome (710 2) (M35 00)    Past Medical History    · History of Bleeding disorder (287 9) (D68 9)   · History of hypertension (V12 59) (Z86 79)   · Myelodysplastic syndrome (238 75) (D46 9)   · Osteoarthritis of left shoulder (715 91) (M19 012)   · History of Pleural Effusion (511 9)    The active problems and past medical history were reviewed and updated today  Surgical History    · History of Appendectomy   · History of Cholecystectomy   · History of Creation Of Pericardial Window   · History of Foot Surgery    The surgical history was reviewed and updated today  Family History    · Family history of myocardial infarction (V17 3) (Z82 49)    · Family history of cerebrovascular accident (V17 1) (Z82 3)   · Family history of diabetes mellitus (V18 0) (Z83 3)    · Family history of cardiac disorder (V17 49) (Z82 49)    The family history was reviewed and updated today  Social History    ·    · Never a smoker   · No alcohol use  The social history was reviewed and updated today  The social history was reviewed and is unchanged  Current Meds   1  Amoxicillin-Pot Clavulanate 875-125 MG Oral Tablet; TAKE 1 TABLET EVERY 12   HOURS DAILY; Therapy: 63IKC8667 to (Evaluate:96Fld3713)  Requested for: 95UMV1146; Last   Rx:49Mni1794 Ordered   2  Calcium 500 + D TABS; Therapy: (XROMOLLE:44OPH9059) to Recorded   3  Cranberry TABS; Therapy: (Recorded:31Mar2015) to Recorded   4  Feosol TABS; Therapy: (Recorded:31Mar2015) to Recorded   5  Green Tea Extract CAPS; Therapy: (Recorded:31Mar2015) to Recorded   6  Lopressor 100 MG Oral Tablet; 1 Tab daily; Therapy: (Recorded:26Mar2014) to Recorded   7  Metoprolol Tartrate TABS Recorded   8  Norvasc 5 MG Oral Tablet; Take 1 tablet daily; Therapy: (Recorded:26Mar2014) to Recorded   9  PredniSONE 5 MG Oral Tablet; TAKE AS DIRECTED  Requested for: 56DES4323; Last   Rx:97Lbl5116 Ordered   10  Probiotic CAPS; Therapy: (Recorded:31Mar2015) to Recorded   11  Vicodin TABS; Therapy: (Recorded:31Mar2015) to Recorded   12  Vitamin B12 TABS; Therapy: (Recorded:31Mar2015) to Recorded   13  Vitamin D 1000 UNIT CAPS; Therapy: (FWKZDZHV:60WVW1349) to Recorded    The medication list was reviewed and updated today  Allergies    1   Robaxin TABS    Vitals  Signs Henrik Fuelling Includes: Current Encounter]    Heart Rate: 84  Systolic: 424  Diastolic: 81  Height: 4 ft 11 in  Weight: 114 lb 8 0 oz  BMI Calculated: 23 13  BSA Calculated: 1 45    Physical Exam   Physical exam lumbar spine: Skin is intact  No obvious deformity  Slightly tender to palpation along the midline at the inferior lumbar spine, as well as the paraspinal muscles, L>R  Neurovascularly intact L2-S1 bialterally with the exception of 0/5 dorsiflexion on the left  Constitutional - General appearance: Normal    Musculoskeletal - Gait and station: Abnormal    Psychiatric - Orientation to person, place, and time: Normal  Mood and affect: Normal    Eyes   Conjunctiva and lids: Normal        Results/Data  I personally reviewed the films/images/results in the office today  My interpretation follows  MRI Review MRI of the lumbar spine is reviewed and reveals multilevel spondylosis, with subsequent L3 to 4 and L5-S1 nerve encroachment  Attending Note  Collaborating Physician Note: Collaborating Note: I interviewed and examined the patient, I supervised the Advanced Practitioner and I agree with the Advanced Practitioner note  I discussed the case with the Advanced Practitioner and reviewed the AP note   Attending Note [facility]: Level of Participation: I was present in clinic and examined the patient  Patient's History: Patient presents for evaluation of severe multilevel, lumbar spinal stenosis with associated radiculopathy and new onset left foot drop  She does have a history of use of chemotherapy and bilateral peripheral neuropathy  She has a history of low platelets and has been advised to avoid lumbar epidural steroid injections or any surgery  She is currently in physical therapy for low back  Key Parts of the Exam: Patient enters with the assistance of a walker  She demonstrates minimal tenderness to palpation over the lumbar spine  She has 4+ out of 5 strength, L2-S1, right lower extremity   She has a foot drop on the left with weak tibialis anterior and extensor hallucis longus function   less than 3/5  Diagnosis and Plan: Multilevel lumbar spinal stenosis with neurological deficit to the left lower extremity  Unfortunately, patient is not a candidate for injections or surgical decompression given low platelets  Ankle-foot orthosis is recommended  Continue with physical therapy        Future Appointments    Date/Time Provider Specialty Site   02/26/2016 01:00 PM Trevon Collazo, [facility] Hematology Oncology [facility]     Signatures   Electronically signed by : Delmy Esqueda, [facility]; Jan 27 2016  1:57PM EST                       (Author)    Electronically signed by : ANTWON Bee ; Jan 27 2016  2:42PM EST                       (Author)

## 2018-01-18 NOTE — MISCELLANEOUS
Message   Recorded as Task   Date: 12/30/2016 12:59 PM, Created By: Rachael Hall   Task Name: Call Back   Assigned To: Elo Guzman   Regarding Patient: eJnnifer Worrell, Status: Active   Comment:    Ailyn Garcia - 30 Dec 2016 12:59 PM     TASK CREATED  Caller: Teresa Millern; [phone]  Daughter is calling to see if it's OK to change pt's blood draw from next week to today  [facility] infusion has an opening at 2:30 today but they told lindsey Aguero to talk with [employer] first to be sure it's OK  Please call back  Patient will be going to the infusion center today to have CBC checked  Sharyn Pinedo is not feeling well today per her daughter  Thinks she may need a transfusion if counts are low  If CBC ok daughter will be having patient seen at urgent care center      Active Problems    1  Acute cervical radiculopathy (723 4) (M54 12)   2  Advance directive discussed with patient (V65 49) (Z71 89)   3  Anemia (285 9) (D64 9)   4  Aortic regurgitation (424 1) (I35 1)   5  Aortic stenosis (424 1) (I35 0)   6  Benign essential HTN (401 1) (I10)   7  Bone pain (733 90) (M89 8X9)   8  Cervical radiculopathy (723 4) (M54 12)   9  Cervical stenosis of spine (723 0) (M48 02)   10  Cervicalgia (723 1) (M54 2)   11  Iron overload (275 09) (E83 19)   12  Lumbar degenerative disc disease (722 52) (M51 36)   13  Lumbar herniated disc (722 10) (M51 26)   14  Lumbar spinal stenosis (724 02) (M48 06)   15  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)   16  Mitral stenosis (394 0) (I05 0)   17  Mixed incontinence (788 33) (N39 46)   18  Muscle pain, myofascial (729 1) (M79 1)   19  Myelodysplastic syndrome (238 75) (D46 9)   20  Nausea (787 02) (R11 0)   21  Need for prophylactic vaccination and inoculation against influenza (V04 81) (Z23)   22  Osteoarthritis of left shoulder (715 91) (M19 012)   23  Osteoarthritis of right shoulder (715 91) (M19 011)   24  Pancytopenia (284 19) (D61 818)   25   Primary localized osteoarthrosis of right shoulder (715 11) (M19 011)   26  Right shoulder pain (719 41) (M25 511)   27  Screening for genitourinary condition (V81 6) (Z13 89)   28  Sjogren's syndrome (710 2) (M35 00)   29  Vertigo (780 4) (R42)    Current Meds   1  AmLODIPine Besylate 5 MG Oral Tablet; 0 5 tab qhs Recorded   2  AmLODIPine Besylate 5 MG Oral Tablet; Take 1 tablet daily; Last Rx:07Nov2016   Ordered   3  Calcium 500 + D TABS; Therapy: (IPPEMLFD:25ENZ9569) to Recorded   4  Compazine 5 MG TABS (Prochlorperazine Maleate); Therapy: (Recorded:05May2016) to Recorded   5  Cranberry TABS; Therapy: (Recorded:31Mar2015) to Recorded   6  Daily Multiple Vitamins TABS; Therapy: (Recorded:05May2016) to Recorded   7  Doxycycline Hyclate 20 MG Oral Tablet; TAKE 1 TABLET TWICE DAILY; Therapy: (Recorded:05May2016) to Recorded   8  Hydrocodone-Acetaminophen 5-325 MG Oral Tablet; TAKE 1 TABLET Every 4 hours   PRN; Therapy: 24WKH1519 to (Evaluate:19Jan2017); Last Rx:79Xae8248 Ordered   9  Loperamide HCl 2 MG TABS; Take 1 tablet daily; Therapy: (Recorded:02Cex6757) to Recorded   10  Metanx 3-90 314-2-35 MG Oral Capsule; take 1 capsule daily; Therapy: 05ZSY9415 to (Evaluate:91Ewo3850); Last Rx:75Mbf3238 Ordered   11  Methadone HCl - 5 MG Oral Tablet; 1/2 tab (2 5MG)po bid (with food) and 1 tab at hs  1/2    tab q 4hours prn; Therapy: 04Hwt3666 to (Evaluate:19Jan2017); Last Rx:33Vdl5579 Ordered   12  Nitroglycerin 0 4 MG Sublingual Tablet Sublingual; TAKE AS DIRECTED; Therapy: (Recorded:62Fak2295) to Recorded   13  PredniSONE 10 MG Oral Tablet; TAKE 1 TABLET DAILY; Last Rx:86Leg1875 Ordered   14  PredniSONE 5 MG Oral Tablet; Take 1 tablet twice daily; Therapy: (Recorded:04Aug2016) to Recorded   15  Prochlorperazine Maleate 5 MG Oral Tablet; TAKE 1 TABLET 3 times daily PRN     Requested for: 18OLA1039; Last Rx:20Udp3632 Ordered   16  Promethazine HCl - 12 5 MG Oral Tablet; TAKE 1 TABLET 3 times daily;     Therapy: (Recorded:62Zmv3947) to Recorded   17  Vitamin B Complex CAPS; Therapy: (Deisi Verde) to Recorded   18  Vitamin D 1000 UNIT CAPS; Therapy: (NUNVXRAD:31MGL4291) to Recorded    Allergies    1   Robaxin TABS    Signatures   Electronically signed by : Jim Grider, ; Dec 30 2016  1:36PM EST                       (Author)

## 2018-01-18 NOTE — PROGRESS NOTES
Assessment    1  Myelodysplastic syndrome (238 75) (D46 9)    Plan  Myelodysplastic syndrome    · Follow-up visit in 6 weeks Evaluation and Treatment  Follow-up  Status: Hold For -  Scheduling  Requested for: 08Apr2016   Ordered; For: Myelodysplastic syndrome; Ordered By: Deb Pang Performed:  Due: 27DGH2291   · (1) FERRITIN; Status:Active; Requested for:08Apr2016;    Perform:[facility]; VEZ:75ZWX0594;OHODISB; For:Myelodysplastic syndrome; Ordered By:Mildred Membreno; Discussion/Summary  Discussion Summary:   In summary, this is an 59-year-old female history of myelodysplasia as outlined above She has been on Dacogen since 11/11 and enjoys good response  She typically needs a unit of blood every 6 or 7 weeks  Platelets do decrease to teens but patient declines platelet transfusions  Her 36 cycle is due 4/11/16  Patient taking Jadenu  Will re-evaluate iron  Medication SE Review and Pt Understands Tx: Possible side effects of new medications were reviewed with the patient/guardian today  The treatment plan was reviewed with the patient/guardian  The patient/guardian understands and agrees with the treatment plan   Understands and agrees with treatment plan: The treatment plan was reviewed with the patient/guardian  The patient/guardian understands and agrees with the treatment plan      History of Present Illness  Previous Therapy:   Initially diagnosed with Silvano syndrome in about 2000  She was found to have myelodysplasia in September 2011  (-20 Q ), IPSS score, intermediate-one  Dacogen started November 2011 on a every 4 week schedule  This was changed to every 6 week schedule in February 2012  Current Therapy: Dacogen 20 mg per meter squared daily x5, every 6 weeks  Interval History: Feeling well  Left foot drop continues to be an issue  Has seen Dr Elly Portillo previously  MRI L-spine in November 2015 identified pronounced degenerative changes   No surgical intervention due to age, comorbidities  Transfusion requirements continue to be approximately 2 U PRBC in 6 week cycle  Has been taking Jadenu omitting the 2 weeks that her platelets decrease  Review of Systems  Complete-Female:   Constitutional: as noted in HPI  Eyes: No complaints of eye pain, no red eyes, no eyesight problems, no discharge, no dry eyes, no itching of eyes  ENT: no complaints of earache, no loss of hearing, no nose bleeds, no nasal discharge, no sore throat, no hoarseness  Cardiovascular: No complaints of slow heart rate, no fast heart rate, no chest pain, no palpitations, no leg claudication, no lower extremity edema  Respiratory: No complaints of shortness of breath, no wheezing, no cough, no SOB on exertion, no orthopnea, no PND  Gastrointestinal: No complaints of abdominal pain, no constipation, no nausea or vomiting, no diarrhea, no bloody stools  Genitourinary: No complaints of dysuria, no incontinence, no pelvic pain, no dysmenorrhea, no vaginal discharge or bleeding  Musculoskeletal: as noted in HPI  Integumentary: as noted in HPI  Neurological: No complaints of headache, no confusion, no convulsions, no numbness, no dizziness or fainting, no tingling, no limb weakness, no difficulty walking  Psychiatric: Not suicidal, no sleep disturbance, no anxiety or depression, no change in personality, no emotional problems  Endocrine: No complaints of proptosis, no hot flashes, no muscle weakness, no deepening of the voice, no feelings of weakness  Hematologic/Lymphatic: No complaints of swollen glands, no swollen glands in the neck, does not bleed easily, does not bruise easily  Active Problems    1  Acute cervical radiculopathy (723 4) (M54 12)   2  Anemia (285 9) (D64 9)   3  Aortic regurgitation (424 1) (I35 1)   4  Aortic stenosis (747 22) (Q25 3)   5  Cervical radiculopathy (723 4) (M54 12)   6  Cervical stenosis of spine (723 0) (M48 02)   7  Cervicalgia (723 1) (M54 2)   8   Iron overload (275 09) (E83 19)   9  Lumbar degenerative disc disease (722 52) (M51 36)   10  Lumbar herniated disc (722 10) (M51 26)   11  Lumbar spinal stenosis (724 02) (M48 06)   12  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)   13  Mitral stenosis (394 0) (I05 0)   14  Muscle pain, myofascial (729 1) (M79 1)   15  Myelodysplastic syndrome (238 75) (D46 9)   16  Nausea (787 02) (R11 0)   17  Osteoarthritis of left shoulder (715 91) (M19 012)   18  Osteoarthritis of right shoulder (715 91) (M19 011)   19  Pancytopenia (284 19) (D61 818)   20  Primary localized osteoarthrosis of right shoulder (715 11) (M19 011)   21  Right shoulder pain (719 41) (M25 511)   22  Sjogren's syndrome (710 2) (M35 00)    Past Medical History    1  History of Bleeding disorder (287 9) (D68 9)   2  History of hypertension (V12 59) (Z86 79)   3  Myelodysplastic syndrome (238 75) (D46 9)   4  Osteoarthritis of left shoulder (715 91) (M19 012)   5  History of Pleural Effusion (511 9)    Surgical History    1  History of Appendectomy   2  History of Cholecystectomy   3  History of Creation Of Pericardial Window   4  History of Foot Surgery    Family History    1  Family history of myocardial infarction (V17 3) (Z82 49)    2  Family history of cerebrovascular accident (V17 1) (Z82 3)   3  Family history of diabetes mellitus (V18 0) (Z83 3)    4  Family history of cardiac disorder (V17 49) (Z80 55)    Social History    ·    · Never a smoker   · No alcohol use    Current Meds   1  Calcium 500 + D TABS; Therapy: ([facility] Health[facility]:92HKJ1728) to Recorded   2  Cranberry TABS; Therapy: (Recorded:31Mar2015) to Recorded   3  Feosol TABS; Therapy: (Recorded:31Mar2015) to Recorded   4  Green Tea Extract CAPS; Therapy: (Recorded:31Mar2015) to Recorded   5  Lopressor 100 MG Oral Tablet; 1 Tab daily; Therapy: (Recorded:26Mar2014) to Recorded   6  Metoprolol Tartrate TABS Recorded   7  Norvasc 5 MG Oral Tablet; Take 1 tablet daily;    Therapy: (Recorded:26Mar2014) to Recorded   8  PredniSONE 5 MG Oral Tablet; TAKE AS DIRECTED  Requested for: 49MUK3950; Last   Rx:21May2015 Ordered   9  Probiotic CAPS; Therapy: (Recorded:31Mar2015) to Recorded   10  Vicodin TABS; Therapy: (Recorded:31Mar2015) to Recorded   11  Vitamin D 1000 UNIT CAPS; Therapy: (TIPSUZYU:24ZHQ0591) to Recorded    Allergies    1  Robaxin TABS    Vitals  Vital Signs [Data Includes: Current Encounter]    Recorded: 08Apr2016 12:55PM   Temperature 97 9 F   Heart Rate 79   Respiration 16   Systolic 772   Diastolic 80   Height 4 ft 11 in   Weight 113 lb 8 0 oz   BMI Calculated 22 92   BSA Calculated 1 45   O2 Saturation 95   Pain Scale 0     Physical Exam    Constitutional   General appearance: No acute distress, well appearing and well nourished  Eyes   Conjunctiva and lids: No swelling, erythema or discharge  Pupils and irises: Equal, round and reactive to light  Ears, Nose, Mouth, and Throat   External inspection of ears and nose: Normal     Oropharynx: Normal with no erythema, edema, exudate or lesions  Pulmonary   Respiratory effort: No increased work of breathing or signs of respiratory distress  Auscultation of lungs: Clear to auscultation  Cardiovascular   Auscultation of heart: Normal rate and rhythm, normal S1 and S2, without murmurs  Examination of extremities for edema and/or varicosities: Normal     Abdomen   Abdomen: Non-tender, no masses  Liver and spleen: No hepatomegaly or splenomegaly  Lymphatic   Palpation of lymph nodes in neck: No lymphadenopathy  Musculoskeletal uses rolling walker for ambulation  Skin   Skin and subcutaneous tissue: Normal without rashes or lesions  Neurologic   Cranial nerves: Cranial nerves 2-12 intact  Psychiatric   Orientation to person, place, and time: Normal     Mood and affect: Normal          Future Appointments    Date/Time Provider Specialty Site   05/05/2016 01:30 PM Dossie Cogan, M D   Cardiology [facility] CARDIOLOG[facility]     Signatures   Electronically signed by : Lakisha Farris, [facility]; Apr 8 2016  1:25PM EST                       (Author)    Electronically signed by : ANTWON Gomez ,DO;  Apr 14 2016  7:38AM EST

## 2018-01-22 VITALS
WEIGHT: 109.56 LBS | SYSTOLIC BLOOD PRESSURE: 118 MMHG | DIASTOLIC BLOOD PRESSURE: 80 MMHG | HEIGHT: 59 IN | TEMPERATURE: 96.7 F | HEART RATE: 80 BPM | RESPIRATION RATE: 18 BRPM | BODY MASS INDEX: 22.09 KG/M2

## 2018-01-24 NOTE — PROGRESS NOTES
Assessment    1  Skin tear of right forearm without complication, subsequent encounter (V58 89,881 00)   (W89 304T)    Wound Care Orders/Instructions    Wound Identification and Instructions   Wound #1: right forearm    Dressing change frequency: Every other day  Comments/Other:   Mepitel One applied to wound base  Covered with gauze secured with syl and tubifast blue  Patient instructed to leave mepitel one intact, may change outer dressing every other day  Apply compression using: Tubifast blue  Wound Goals  Wound Goals:   Healing Goals:   Fair healing potential, expect slow healing progress secondary to co-morbid conditions and advanced age  Wound base will be 25%    Patient will achieve full wound closure and return to full ADLs       Discussion/Summary    Wound looks great, smaller in size  no signs of infection  continue current care  Chief Complaint  Follow up for right forearm wound  History of Present Illness    Wound Identification HPI   Wound #1: right forearm          The patient came to Wound Care via wheelchair, walker at home  The patient is being seen for an initial evaluation/start of care at the [address] [place] [address]  The patient's identification was verified  A secondary verification process was completed  Orientation: oriented to person, oriented to place and oriented to time  Blood Glucose:  Not applicable  4/4/17: The patient is referred by PCP for right forearm wound  The patient states that on 3/24/17 she woke up to use the restroom, began to lose her balance while crossing threshold and banged her right arm into the door jam  She was seen at [facility] at which time betadine and telfa was applied  The patient has [employer] in her home who assist with changing the telfa  The patient relates she was on a "sulfa" antibiotic for six days but had stomach upset from it  Arrived today with telfa, no drainage noted  The patient is on chemo and prednisone  Has left chest port a cath  4/11/2017: Here for wound care follow-up, dressing intact on arrival denies any problem with wound or dressing  Care giver changed her outer dressing once thru the week  History of Falling: No = 0   Secondary Diagnosis: Yes = 15   Ambulatory Aid Crutches, Paulette Rascona = 15   Yes = 20   Gait: Weak = 10 -- Short steps (may shuffle), stooped but able to lift head while walking, may seek support from furniture while walking, but with light touch (for reassurance)  Mental Status: Oriented to own ability = 0   Total Score: 60          Number of falls in the last year were 0  Nutrition Assessment Screening: Food intake over the last 3 months due to the loss of appetite, digestive problems, chewing or swallowing difficulties is graded as: 2 = no decrease in food intake   Weight loss during the last 3 months: 3 = no weight loss   Mobility scored as: 1 = able to get out of chair or bed but does not go out  Psychological Stress and Acute Disease Scored as: 2 = The patient has not experienced psychological stress or acute disease in the last 3 months  Neuropsychological problems scored as: 2 = no psychological problems  Body Mass Index (BMI) scored as: 2 = BMI 21 to less than 23  Nutritional Assessment Screening Score: 12 - 14 points - Normal nutritional status  Provider Wound Care HPI: f/u R forearm wound  no new issues or complaints  Pain Assessment   the patient states they do not have pain  (on a scale of 0 to 10, the patient rates the pain at 0 )   Abuse And Domestic Violence Screen    Yes, the patient is safe at home  The patient states no one is hurting them  Depression And Suicide Screen  No, the patient has not had thoughts of hurting themself  No, the patient has not felt depressed in the past 7 days  Prefered Language is  [place]  Primary Language is  English  Readiness To Learn: Receptive  Barriers To Learning: none     Preferred Learning: demonstration and verbal   Education Completed: treatment/procedure   Teaching Method: verbal   Person Taught: patient   Evaluation Of Learning: verbalized/demonstrated understanding      Review of Systems    Constitutional: No fever, no chills, feels well, no tiredness, no recent weight gain or loss  Active Problems    1  Acute cervical radiculopathy (723 4) (M54 12)   2  Advance directive discussed with patient (V65 49) (Z71 89)   3  Anemia (285 9) (D64 9)   4  Aortic regurgitation (424 1) (I35 1)   5  Aortic stenosis (424 1) (I35 0)   6  Benign essential HTN (401 1) (I10)   7  Bone pain (733 90) (M89 8X9)   8  Cervical radiculopathy (723 4) (M54 12)   9  Cervical stenosis of spine (723 0) (M48 02)   10  Cervicalgia (723 1) (M54 2)   11  Chemotherapy-induced nausea (787 02,E933 1) (R11 0,T45  1X5A)   12  Cough (786 2) (R05)   13  Dizziness (780 4) (R42)   14  Iron overload (275 09) (E83 19)   15  Lumbar degenerative disc disease (722 52) (M51 36)   16  Lumbar herniated disc (722 10) (M51 26)   17  Lumbar spinal stenosis (724 02) (M48 06)   18  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)   19  Mitral stenosis (394 0) (I05 0)   20  Muscle pain, myofascial (729 1) (M79 1)   21  Myelodysplastic syndrome (238 75) (D46 9)   22  Need for prophylactic vaccination and inoculation against influenza (V04 81) (Z23)   23  Osteoarthritis of left shoulder (715 91) (M19 012)   24  Osteoarthritis of right shoulder (715 91) (M19 011)   25  Pancytopenia (284 19) (D61 818)   26  Primary localized osteoarthrosis of right shoulder (715 11) (M19 011)   27  Right shoulder pain (719 41) (M25 511)   28  Screening for genitourinary condition (V81 6) (Z13 89)   29  Sjogren's syndrome (710 2) (M35 00)   30  Traumatic hematoma of right upper arm (923 03) (S40 021A)   31  Urinary incontinence, mixed (788 33) (N39 46)   32  Wound of skin (782 9) (R23 8)    Past Medical History    1   History of Bleeding disorder (287 9) (D68 9)   2  History of hypertension (V12 59) (Z86 79)   3  Myelodysplastic syndrome (238 75) (D46 9)   4  Osteoarthritis of left shoulder (715 91) (M19 012)   5  History of Pleural Effusion (511 9)    The active problems and past medical history were reviewed and updated today  Surgical History    1  History of Appendectomy   2  History of Cholecystectomy   3  History of Creation Of Pericardial Window   4  History of Foot Surgery    Family History    1  Family history of myocardial infarction (V17 3) (Z82 49)    2  Family history of cerebrovascular accident (V17 1) (Z82 3)   3  Family history of diabetes mellitus (V18 0) (Z83 3)    4  Family history of cardiac disorder (V17 49) (Z80 55)    Social History    ·    · Never a smoker   · No alcohol use  The social history was reviewed and updated today  The social history was reviewed and is unchanged  Current Meds   1  AmLODIPine Besylate 5 MG Oral Tablet; 0 5 tab qhs Recorded   2  AmLODIPine Besylate 5 MG Oral Tablet; Take 1 tablet daily; Last Rx:07Nov2016 Ordered   3  Benzonatate 100 MG Oral Capsule; TAKE 1 CAPSULE EVERY 8 HOURS AS NEEDED; Therapy: 24YYC7663 to (Evaluate:24Apr2017)  Requested for: 68XMG1301; Last   Rx:66Uan5824 Ordered   4  Diclofenac Sodium 1 % Transdermal Gel; apply sparingly to affected area(s) twice daily; Therapy: 09PNA3744 to (Evaluate:12Apr2017)  Requested for: 33VQW7564; Last   Rx:13Mar2017 Ordered   5  Doxycycline Hyclate 20 MG Oral Tablet; TAKE 1 TABLET TWICE DAILY; Therapy: (Recorded:33Wti3125) to Recorded   6  Hydrocodone-Acetaminophen 5-325 MG Oral Tablet; TAKE 1-2 TABLETs Every 4 hours   PRN pain; Therapy: 13KAC6509 to (Evaluate:12Apr2017); Last Rx:13Mar2017 Ordered   7  Loperamide HCl - 2 MG Oral Capsule; take 1 capsule daily; Therapy: 14VRO2346 to (Evaluate:92Ghm0891)  Requested for: 30Cma6322; Last   Rx:10Apr2017 Ordered   8  Meclizine HCl - 25 MG Oral Tablet; TAKE 1 TABLET 3 times daily PRN;    Therapy: 18ZFN4522 to (Evaluate:24Apr2017)  Requested for: 24QTM7687; Last   Rx:71Kxh9083 Ordered   9  Metanx 3-90 314-2-35 MG Oral Capsule; take 1 capsule daily; Therapy: 25HFG7966 to (Evaluate:15Xcd6094)  Requested for: 32Owk0173; Last   Rx:50Wgf8618 Ordered   10  Methadone HCl - 5 MG Oral Tablet; TAKE 1 5 TAB (7 5MG) AT DINNERTIME, AFTER ONE    WEEK ADD 0 5 TAB WITH LUNCH;    Therapy: 99KAD4390 to (Evaluate:12Apr2017); Last Rx:13Mar2017 Ordered   11  OLANZapine 2 5 MG Oral Tablet; TAKE 1 TABLET Bedtime; Therapy: 61WIF6424 to (Evaluate:12Apr2017)  Requested for: 38CRL9952; Last    Rx:13Mar2017 Ordered   12  Ondansetron HCl - 4 MG Oral Tablet; TAKE 1 TABLET Every 4 hours PRN NAUSEA MAX 6    TABS/DAY; Therapy: 88PHZ9256 to (Evaluate:12Apr2017)  Requested for: 90OHG3523; Last    Rx:13Mar2017 Ordered   13  PredniSONE 10 MG Oral Tablet; TAKE 1 TABLET DAILY; Last Rx:85Ntf4092 Ordered   14  Sulfamethoxazole-Trimethoprim 800-160 MG Oral Tablet; TAKE 1 TABLET TWICE DAILY    WITH FOOD; Therapy: 32GEL1299 to (Evaluate:28Mar2017)  Requested for: 27RDK4662; Last    Rx:18Mar2017 Ordered   15  Vitamin B Complex CAPS; Therapy: (Courtney Miller) to Recorded   16  Vitamin D 1000 UNIT CAPS; Therapy: (QAXGLQET:03MVA0596) to Recorded    The medication list was reviewed and updated today  Allergies    1  Robaxin TABS    Vitals  Vital Signs    Recorded: 11Apr2017 04:17PM   Temperature 96 7 F   Heart Rate 80   Respiration 18   Systolic 470   Diastolic 80   Height 4 ft 11 in   Weight 109 lb 9 oz   BMI Calculated 22 13   BSA Calculated 1 43   Pain Scale 0     Physical Exam    Wound #1 Assessment wound #1 Location:, right forearm, started on 3/24/17, Care for this wound started on 4/4/17  Wound Status: not healed  Wound Grade (All Others): Full Thickness     Length: 0 8cm x Width: 2cm x   Total: 1 6sq cm   Wound Volume:           Tissue type: Eschar   Color of Wound: Black - 100%   Exudate Amount: None   Exudate Type: Serosangiunous   Odor: None   Wound Edges: Intact   Periwound Skin Condition: Intact        Physician/Provider Wound #1 Exam   I agree with the nursing assessment and documentation  Cole Thacker 1: Wound Nursing Care Plan   Impaired Tissue Integrity related to: right forearm   Pain related to disease process and/or wound treatment:   Goals   Patient will achieve 100% epithelialization:  Patient will maintain skin integrity:  Wound Nursing Care Interventions:   Provide moist wound healing:  Implement offloading measures (turn & reposition schedule/method, ortho shoes/boots, floating heels, crutches, specialty surfaces:  Care plan initiated 4/4/17      Procedure      Wound #1: right forearm     Nurse Dressing Change:   Wound #1 The wound located on the right forearm  Wound care rendered as per Physician/Advanced Practitioner order/plan  Return to [facility] two weeks  Future Appointments    Date/Time Provider Specialty Site   05/18/2017 02:30 PM ANTWON Cabrera , , [facility] Hematology Oncology [facility]   04/25/2017 01:00 PM Ignacio Braun [address] [place] [address]   05/17/2017 02:00 PM ANTWON Sandoval  Cardiology [facility]   05/30/2017 03:00 PM ANTWON Grigsby  Internal Medicine [facility]   04/19/2017 01:00 PM [facility]     Signatures   Electronically signed by : Fidelia Robertson DO;  Apr 12 2017  1:44PM EST                       (Author)